# Patient Record
Sex: FEMALE | Race: BLACK OR AFRICAN AMERICAN | NOT HISPANIC OR LATINO | Employment: OTHER | URBAN - METROPOLITAN AREA
[De-identification: names, ages, dates, MRNs, and addresses within clinical notes are randomized per-mention and may not be internally consistent; named-entity substitution may affect disease eponyms.]

---

## 2017-11-09 ENCOUNTER — OFFICE VISIT (OUTPATIENT)
Dept: OBSTETRICS AND GYNECOLOGY | Facility: CLINIC | Age: 67
End: 2017-11-09
Payer: COMMERCIAL

## 2017-11-09 VITALS
SYSTOLIC BLOOD PRESSURE: 138 MMHG | HEIGHT: 59 IN | DIASTOLIC BLOOD PRESSURE: 83 MMHG | BODY MASS INDEX: 28.46 KG/M2 | WEIGHT: 141.19 LBS

## 2017-11-09 DIAGNOSIS — Z12.31 SCREENING MAMMOGRAM, ENCOUNTER FOR: ICD-10-CM

## 2017-11-09 DIAGNOSIS — Z12.4 SCREENING FOR CERVICAL CANCER: ICD-10-CM

## 2017-11-09 DIAGNOSIS — Z01.419 ENCOUNTER FOR GYNECOLOGICAL EXAMINATION (GENERAL) (ROUTINE) WITHOUT ABNORMAL FINDINGS: Primary | ICD-10-CM

## 2017-11-09 PROCEDURE — G0101 CA SCREEN;PELVIC/BREAST EXAM: HCPCS | Mod: PBBFAC,PN | Performed by: OBSTETRICS & GYNECOLOGY

## 2017-11-09 PROCEDURE — 99214 OFFICE O/P EST MOD 30 MIN: CPT | Mod: PBBFAC,25,PN | Performed by: OBSTETRICS & GYNECOLOGY

## 2017-11-09 PROCEDURE — 99397 PER PM REEVAL EST PAT 65+ YR: CPT | Mod: S$GLB,,, | Performed by: OBSTETRICS & GYNECOLOGY

## 2017-11-09 PROCEDURE — 99999 PR PBB SHADOW E&M-EST. PATIENT-LVL IV: CPT | Mod: PBBFAC,,, | Performed by: OBSTETRICS & GYNECOLOGY

## 2017-11-09 RX ORDER — NAPROXEN SODIUM 220 MG
TABLET ORAL
COMMUNITY
Start: 2015-06-03 | End: 2017-11-09

## 2017-11-09 RX ORDER — NAPROXEN 500 MG/1
500 TABLET ORAL
COMMUNITY
End: 2021-12-13

## 2017-11-09 RX ORDER — FLUTICASONE PROPIONATE 0.5 MG/G
CREAM TOPICAL
COMMUNITY
Start: 2017-10-26 | End: 2017-11-09

## 2017-11-09 RX ORDER — METHOTREXATE 2.5 MG/1
TABLET ORAL
COMMUNITY
Start: 2017-09-28 | End: 2017-11-09

## 2017-11-09 RX ORDER — IBUPROFEN 800 MG/1
800 TABLET ORAL
COMMUNITY
End: 2017-11-09

## 2017-11-09 RX ORDER — VALACYCLOVIR HYDROCHLORIDE 1 G/1
TABLET, FILM COATED ORAL
COMMUNITY
Start: 2016-07-12 | End: 2022-04-26 | Stop reason: ALTCHOICE

## 2017-11-09 RX ORDER — GUSELKUMAB 100 MG/ML
INJECTION SUBCUTANEOUS
COMMUNITY
Start: 2017-10-11 | End: 2022-04-26

## 2017-11-09 RX ORDER — HYDROCORTISONE ACETATE, PRAMOXINE HCL 2.5; 1 G/100G; G/100G
CREAM TOPICAL
COMMUNITY
Start: 2015-11-10 | End: 2022-04-26 | Stop reason: ALTCHOICE

## 2017-11-09 RX ORDER — GLIMEPIRIDE 4 MG/1
4 TABLET ORAL
COMMUNITY
Start: 2016-03-17 | End: 2017-11-09

## 2017-11-09 RX ORDER — ONDANSETRON 4 MG/1
4 TABLET, FILM COATED ORAL
COMMUNITY
Start: 2017-11-01 | End: 2018-11-01

## 2017-11-09 RX ORDER — METFORMIN HYDROCHLORIDE 500 MG/1
TABLET ORAL
COMMUNITY
Start: 2015-10-15 | End: 2017-11-09

## 2017-11-09 RX ORDER — METRONIDAZOLE 500 MG/1
TABLET ORAL
COMMUNITY
Start: 2017-10-30 | End: 2017-11-09

## 2017-11-09 RX ORDER — MECLIZINE HYDROCHLORIDE 25 MG/1
TABLET ORAL
COMMUNITY
Start: 2017-08-14 | End: 2021-12-13

## 2017-11-09 RX ORDER — INSULIN GLARGINE 100 [IU]/ML
INJECTION, SOLUTION SUBCUTANEOUS
COMMUNITY
Start: 2014-09-30 | End: 2017-11-09

## 2017-11-09 RX ORDER — FLUTICASONE PROPIONATE 50 MCG
1 SPRAY, SUSPENSION (ML) NASAL 2 TIMES DAILY
COMMUNITY
Start: 2017-09-20

## 2017-11-09 RX ORDER — CLOBETASOL PROPIONATE 0.5 MG/G
CREAM TOPICAL
COMMUNITY
Start: 2017-03-27 | End: 2017-11-09

## 2017-11-09 RX ORDER — ESOMEPRAZOLE MAGNESIUM 40 MG/1
CAPSULE, DELAYED RELEASE ORAL
COMMUNITY
Start: 2017-07-11 | End: 2017-11-09

## 2017-11-09 RX ORDER — MUPIROCIN CALCIUM 20 MG/G
CREAM TOPICAL
COMMUNITY
Start: 2015-12-18 | End: 2022-04-26 | Stop reason: ALTCHOICE

## 2017-11-09 RX ORDER — FLUCONAZOLE 150 MG/1
TABLET ORAL
COMMUNITY
Start: 2017-09-11 | End: 2018-03-08

## 2017-11-09 RX ORDER — VALSARTAN AND HYDROCHLOROTHIAZIDE 320; 25 MG/1; MG/1
TABLET, FILM COATED ORAL
COMMUNITY
Start: 2016-11-03 | End: 2017-11-09

## 2017-11-09 RX ORDER — OMEPRAZOLE 40 MG/1
CAPSULE, DELAYED RELEASE ORAL
COMMUNITY
Start: 2016-08-25 | End: 2017-11-09

## 2017-11-09 RX ORDER — CIPROFLOXACIN 500 MG/1
TABLET ORAL
COMMUNITY
Start: 2017-10-30 | End: 2018-03-08

## 2017-11-09 RX ORDER — HYDROCODONE BITARTRATE AND ACETAMINOPHEN 10; 325 MG/1; MG/1
1 TABLET ORAL
COMMUNITY
Start: 2017-10-31 | End: 2018-03-08

## 2017-11-09 RX ORDER — ACYCLOVIR 50 MG/G
OINTMENT TOPICAL
COMMUNITY
Start: 2017-10-26 | End: 2022-04-26 | Stop reason: ALTCHOICE

## 2017-11-09 RX ORDER — IBUPROFEN 200 MG
TABLET ORAL
COMMUNITY
Start: 2017-10-13 | End: 2022-04-26 | Stop reason: ALTCHOICE

## 2017-11-09 RX ORDER — TRIAMCINOLONE ACETONIDE 1 MG/G
OINTMENT TOPICAL
COMMUNITY
Start: 2016-12-05 | End: 2017-11-09

## 2017-11-09 RX ORDER — PROMETHAZINE HYDROCHLORIDE 25 MG/1
TABLET ORAL
COMMUNITY
Start: 2017-10-30 | End: 2022-04-26

## 2017-11-09 RX ORDER — SYRINGE,SAFETY WITH NEEDLE,1ML 25GX1"
SYRINGE (EA) MISCELLANEOUS
COMMUNITY
Start: 2014-05-23 | End: 2022-04-26 | Stop reason: ALTCHOICE

## 2017-11-09 NOTE — PROGRESS NOTES
Subjective:       Patient ID: Rolly Swain is a 67 y.o. female.    Chief Complaint:  Well Woman      History of Present Illness  HPI  Annual Exam-Postmenopausal  Patient presents for annual exam. The patient has no complaints today. The patient is not currently sexually active. GYN screening history: last pap: was normal and patient does not recall when last pap was and last mammogram: approximate date  and was normal. The patient is not currently taking hormone replacement therapy. Patient denies post-menopausal vaginal bleeding. The patient wears seatbelts: yes. The patient participates in regular exercise: yes--walking--but limited due to back issue (dr giron). Has the patient ever been transfused or tattooed?: yes. The patient reports that there is not domestic violence in her life.      Colonoscopy in 2017--may need colon resection--diverticulosis    C/o urgency symptoms        GYN & OB HistoryNo LMP recorded. Patient has had a hysterectomy.   Date of Last Pap: No result found    OB History    Para Term  AB Living   5 5       5   SAB TAB Ectopic Multiple Live Births                  # Outcome Date GA Lbr Brennon/2nd Weight Sex Delivery Anes PTL Lv   5 Para            4 Para            3 Para            2 Para            1 Para                   Review of Systems  Review of Systems   Constitutional: Negative for activity change, appetite change, chills, diaphoresis, fatigue, fever and unexpected weight change.   HENT: Negative for mouth sores and tinnitus.    Eyes: Negative for discharge and visual disturbance.   Respiratory: Negative for cough, shortness of breath and wheezing.    Cardiovascular: Negative for chest pain, palpitations and leg swelling.   Gastrointestinal: Negative for abdominal pain, bloating, blood in stool, constipation, diarrhea, nausea and vomiting.   Endocrine: Negative for diabetes, hair loss, hot flashes, hyperthyroidism and hypothyroidism.   Genitourinary: Negative  for decreased libido, dyspareunia, dysuria, flank pain, frequency, genital sores, hematuria, menorrhagia, menstrual problem, pelvic pain, urgency, vaginal bleeding, vaginal discharge, vaginal pain, dysmenorrhea, urinary incontinence, postcoital bleeding, postmenopausal bleeding and vaginal odor.   Musculoskeletal: Negative for back pain and myalgias.   Skin:  Negative for rash, no acne and hair changes.   Neurological: Negative for seizures, syncope, numbness and headaches.   Hematological: Negative for adenopathy. Does not bruise/bleed easily.   Psychiatric/Behavioral: Negative for depression and sleep disturbance. The patient is not nervous/anxious.    Breast: Negative for breast mass, breast pain, nipple discharge and skin changes          Objective:    Physical Exam:   Constitutional: She appears well-developed.     Eyes: Conjunctivae and EOM are normal. Pupils are equal, round, and reactive to light.    Neck: Normal range of motion. Neck supple.     Pulmonary/Chest: Effort normal. Right breast exhibits no mass, no nipple discharge, no skin change and no tenderness. Left breast exhibits no mass, no nipple discharge, no skin change and no tenderness. Breasts are symmetrical.        Abdominal: Soft.     Genitourinary: Rectum normal and vagina normal. Pelvic exam was performed with patient supine. Uterus is absent. Right adnexum displays no mass and no tenderness. Left adnexum displays no mass and no tenderness. No erythema, bleeding, rectocele, cystocele or unspecified prolapse of vaginal walls in the vagina. No vaginal discharge found. Vaginal cuff normal.Labial bartholins normal.Cervix exhibits absence. Additional cervical findings: pap smear done          Musculoskeletal: Normal range of motion.       Neurological: She is alert.    Skin: Skin is warm.    Psychiatric: She has a normal mood and affect.          Assessment:     Encounter Diagnoses   Name Primary?    Encounter for gynecological examination  (general) (routine) without abnormal findings Yes    Screening for cervical cancer     Screening mammogram, encounter for                Plan:      Continue annual well woman exam.  Pap not indicated due to hx of terrie; nml pap  mammo ordered (prefers at [facility])  Bmd ordered; osteoporosis prevention reviewed  Continue diet, exercise, weight loss

## 2018-01-04 ENCOUNTER — TELEPHONE (OUTPATIENT)
Dept: OBSTETRICS AND GYNECOLOGY | Facility: CLINIC | Age: 68
End: 2018-01-04

## 2018-01-04 NOTE — TELEPHONE ENCOUNTER
Please advise bone density is normal; continue calcium/vitamin d and weight bearing exercise as directed

## 2018-01-08 ENCOUNTER — TELEPHONE (OUTPATIENT)
Dept: OBSTETRICS AND GYNECOLOGY | Facility: CLINIC | Age: 68
End: 2018-01-08

## 2018-01-08 NOTE — TELEPHONE ENCOUNTER
Left messages for the pt. to call back. urban whitaker    Please advise bone density is normal; continue calcium/vitamin d and weight bearing exercise as directed

## 2018-01-08 NOTE — TELEPHONE ENCOUNTER
----- Message from Elyssa Dumont sent at 1/8/2018  1:11 PM CST -----  Contact: Pt.  Returning call to their office.  Call pt back at [phone] (qvow)

## 2018-01-08 NOTE — TELEPHONE ENCOUNTER
LM to discuss the following.... Bone density scan was normal, continue calcium/vitamin D and weight bearing exercise as needed. DS

## 2018-01-15 ENCOUNTER — TELEPHONE (OUTPATIENT)
Dept: OBSTETRICS AND GYNECOLOGY | Facility: CLINIC | Age: 68
End: 2018-01-15

## 2018-01-29 ENCOUNTER — TELEPHONE (OUTPATIENT)
Dept: OBSTETRICS AND GYNECOLOGY | Facility: CLINIC | Age: 68
End: 2018-01-29

## 2018-01-30 ENCOUNTER — TELEPHONE (OUTPATIENT)
Dept: OBSTETRICS AND GYNECOLOGY | Facility: CLINIC | Age: 68
End: 2018-01-30

## 2018-01-30 NOTE — TELEPHONE ENCOUNTER
Spoke to the pt. and informed her that her bone density is normal; continue calcium/vitamin d and weight bearing exercise as directed.  Pt states she's not sure if she will continue to take calcium or vit D.  Informed the pt. That Dr. Spring recommended it. Pt. acknowledged understanding. urban Saxena

## 2018-03-07 ENCOUNTER — TELEPHONE (OUTPATIENT)
Dept: OBSTETRICS AND GYNECOLOGY | Facility: CLINIC | Age: 68
End: 2018-03-07

## 2018-03-07 NOTE — TELEPHONE ENCOUNTER
----- Message from Nancy Obregon sent at 3/7/2018 11:00 AM CST -----  Contact: Pt  Please give pt a call at ..[phone](pdto) regarding something being called in for a discharge.

## 2018-03-07 NOTE — TELEPHONE ENCOUNTER
C/o vaginal discharge and she douched and woke up this morning soaking again. She wanted Dr. Spring to send her an Rx for vaginal discharge but I informed her we have to evaluate it physically to assist her properly because she is uncertain what's going on. Pt verbalizes understanding. DS

## 2018-03-08 ENCOUNTER — OFFICE VISIT (OUTPATIENT)
Dept: OBSTETRICS AND GYNECOLOGY | Facility: CLINIC | Age: 68
End: 2018-03-08
Payer: COMMERCIAL

## 2018-03-08 VITALS
DIASTOLIC BLOOD PRESSURE: 82 MMHG | HEIGHT: 59 IN | BODY MASS INDEX: 28.89 KG/M2 | WEIGHT: 143.31 LBS | SYSTOLIC BLOOD PRESSURE: 120 MMHG

## 2018-03-08 DIAGNOSIS — N95.2 ATROPHIC VAGINITIS: Primary | ICD-10-CM

## 2018-03-08 DIAGNOSIS — N89.8 VAGINAL DISCHARGE: ICD-10-CM

## 2018-03-08 PROCEDURE — 99999 PR PBB SHADOW E&M-EST. PATIENT-LVL IV: CPT | Mod: PBBFAC,,, | Performed by: OBSTETRICS & GYNECOLOGY

## 2018-03-08 PROCEDURE — 99213 OFFICE O/P EST LOW 20 MIN: CPT | Mod: S$GLB,,, | Performed by: OBSTETRICS & GYNECOLOGY

## 2018-03-08 PROCEDURE — 99214 OFFICE O/P EST MOD 30 MIN: CPT | Mod: PBBFAC,PN | Performed by: OBSTETRICS & GYNECOLOGY

## 2018-03-08 PROCEDURE — 87480 CANDIDA DNA DIR PROBE: CPT

## 2018-03-08 RX ORDER — INSULIN GLARGINE 300 U/ML
INJECTION, SOLUTION SUBCUTANEOUS
COMMUNITY
Start: 2018-01-27 | End: 2022-04-26

## 2018-03-09 LAB
CANDIDA RRNA VAG QL PROBE: NEGATIVE
G VAGINALIS RRNA GENITAL QL PROBE: NEGATIVE
T VAGINALIS RRNA GENITAL QL PROBE: NEGATIVE

## 2018-03-12 ENCOUNTER — TELEPHONE (OUTPATIENT)
Dept: OBSTETRICS AND GYNECOLOGY | Facility: CLINIC | Age: 68
End: 2018-03-12

## 2018-03-12 NOTE — TELEPHONE ENCOUNTER
Pt. Would like to try the estrogen cream. Spoke tot he pt. and advise the  vaginal cultures are negative for bacteria, yeast and trichomonas. Pt. Acknowledged understanding. urban Saxena

## 2019-08-29 NOTE — TELEPHONE ENCOUNTER
Infectious Disease outpatient follow up note      LOCATION: Aminata Cavazos is a 61 year old female who came here for follow up for DIANNA    S: stable, no new issue, no pain, no fever, no cough, no shortness of breath        Sitting on chair, RN in the room  Some cough    ROS:    General: no fever, no pain  HEENT: no visual problems, no hearing issues, no headache  Neck: No swelling or pain  Chest: no coughing, no shortness of breath  CVS: no chest pain, no palpitation  GI: no nausea, vomiting, no diarrhea, no abd pain  : no urinary frequency, no dysuria, no discharge  Ext no edema   Skin: no rash  Neuro: no weakness, no tremors, no haedache  Psych: no anxiety, no depression        O:        Vital Last Value   Temperature 98.5 °F (36.9 °C) (08/29/19 1526)   Pulse 91 (08/29/19 1526)   Respiratory 14 (08/29/19 1526)   Non-Invasive  Blood Pressure (!) 88/50 (08/29/19 1526)   Pulse Oximetry 93 % (08/29/19 1526)   Arterial   Blood Pressure       Vital Today   Weight 38.3 kg (08/29/19 1526)   Height N/A   BMI N/A        EXAM:  GEN:  Stable, not in acute distress  HEENT: no icterus, no conjunctival hemorrhages  NECK: supple, No JVD, No thyromegaly, No Bruit  CVS: s1s2 audible, regular  CHEST: clear, decreased breath sounds at bases  ABD: soft, non tender  EXT: no edema  NEURO: grossly non focal  SKIN: no rash, no ulcer  LYMP: no cervical lymphadenopathy  PSYCH: stable, not anxious or depressed     LABS:     Reviewed:       CULTURES:  Reviewed: Blood:                                         Other:     IMAGING:  Reviewed: CXR:                      CT Scan:                                           Other     MEDS:       Severe chronic obstructive pulmonary disease.   2.  Mycobacterium avium intracellulare lung infection.  3.  Elevated troponin and NSTMI.  4.  Cardiomyopathy.     RECOMMENDATIONS:  Discussed with nursing staff in detail.  Will continue current antibiotic.    Rifampin, ethambutol  Left messages for the pt. to call back. urban whitaker    Please advise bone density is normal; continue calcium/vitamin d and weight bearing exercise as directed   clarithromycin  Follow LFTs. monthly  Eye screening, follow up ophthalmology for base line  Need to get monthly sputum, compliance issue d/w pt  Follow imaging  Depending on cultures we will modify the patient's edema.  We will adjust antibiotic dose according to creatinine clearance.  I will closely follow this patient with the primary team. Thanks for involving me in this patient's care.    Counseling done, risk , benefit, treatment side effects discussed with patient, all questions answered     Follow up in 8 weeks    Discussed with: Patient, RN,  Will follow,    Thanks,    Nelson Rodriguez MD    Please dial [phone] for answering service  CC: Mat Wren DO

## 2021-12-13 ENCOUNTER — OFFICE VISIT (OUTPATIENT)
Dept: OBSTETRICS AND GYNECOLOGY | Facility: CLINIC | Age: 71
End: 2021-12-13
Payer: COMMERCIAL

## 2021-12-13 VITALS
BODY MASS INDEX: 26.8 KG/M2 | DIASTOLIC BLOOD PRESSURE: 80 MMHG | HEIGHT: 59 IN | WEIGHT: 132.94 LBS | SYSTOLIC BLOOD PRESSURE: 124 MMHG

## 2021-12-13 DIAGNOSIS — N94.10 DYSPAREUNIA IN FEMALE: ICD-10-CM

## 2021-12-13 DIAGNOSIS — N95.2 VAGINAL ATROPHY: ICD-10-CM

## 2021-12-13 DIAGNOSIS — R30.0 DYSURIA: ICD-10-CM

## 2021-12-13 DIAGNOSIS — Z12.31 SCREENING MAMMOGRAM, ENCOUNTER FOR: ICD-10-CM

## 2021-12-13 DIAGNOSIS — Z12.4 SCREENING FOR CERVICAL CANCER: ICD-10-CM

## 2021-12-13 DIAGNOSIS — Z01.419 ENCOUNTER FOR GYNECOLOGICAL EXAMINATION (GENERAL) (ROUTINE) WITHOUT ABNORMAL FINDINGS: Primary | ICD-10-CM

## 2021-12-13 PROCEDURE — 99387 PR PREVENTIVE VISIT,NEW,65 & OVER: ICD-10-PCS | Mod: S$GLB,,, | Performed by: OBSTETRICS & GYNECOLOGY

## 2021-12-13 PROCEDURE — G0101 CA SCREEN;PELVIC/BREAST EXAM: HCPCS | Mod: PBBFAC,PN | Performed by: OBSTETRICS & GYNECOLOGY

## 2021-12-13 PROCEDURE — 99387 INIT PM E/M NEW PAT 65+ YRS: CPT | Mod: S$GLB,,, | Performed by: OBSTETRICS & GYNECOLOGY

## 2021-12-13 PROCEDURE — 99999 PR PBB SHADOW E&M-NEW PATIENT-LVL IV: ICD-10-PCS | Mod: PBBFAC,,, | Performed by: OBSTETRICS & GYNECOLOGY

## 2021-12-13 PROCEDURE — 99999 PR PBB SHADOW E&M-NEW PATIENT-LVL IV: CPT | Mod: PBBFAC,,, | Performed by: OBSTETRICS & GYNECOLOGY

## 2021-12-13 PROCEDURE — 99204 OFFICE O/P NEW MOD 45 MIN: CPT | Mod: PBBFAC,PN,25 | Performed by: OBSTETRICS & GYNECOLOGY

## 2021-12-13 PROCEDURE — 87086 URINE CULTURE/COLONY COUNT: CPT | Performed by: OBSTETRICS & GYNECOLOGY

## 2021-12-13 RX ORDER — ATORVASTATIN CALCIUM 20 MG/1
20 TABLET, FILM COATED ORAL
COMMUNITY
Start: 2021-07-24 | End: 2022-04-26

## 2021-12-13 RX ORDER — PREGABALIN 100 MG/1
100 CAPSULE ORAL 2 TIMES DAILY
COMMUNITY
Start: 2021-11-15

## 2021-12-13 RX ORDER — CHOLECALCIFEROL (VITAMIN D3) 1250 MCG
1 TABLET ORAL
COMMUNITY
Start: 2021-04-28

## 2021-12-13 RX ORDER — ESTRADIOL 10 UG/1
INSERT VAGINAL
Qty: 18 TABLET | Refills: 6 | Status: SHIPPED | OUTPATIENT
Start: 2021-12-13 | End: 2022-04-26

## 2021-12-13 RX ORDER — RISANKIZUMAB-RZAA 75 MG/0.83
150 KIT SUBCUTANEOUS
COMMUNITY
Start: 2021-06-18

## 2021-12-13 RX ORDER — GLIPIZIDE 5 MG/1
5 TABLET ORAL DAILY
COMMUNITY
Start: 2021-11-13

## 2021-12-13 RX ORDER — SERTRALINE HYDROCHLORIDE 50 MG/1
TABLET, FILM COATED ORAL
COMMUNITY
Start: 2021-08-20 | End: 2022-04-26

## 2021-12-13 RX ORDER — TRAZODONE HYDROCHLORIDE 50 MG/1
TABLET ORAL
COMMUNITY
Start: 2021-08-20 | End: 2022-04-26

## 2021-12-13 RX ORDER — DULAGLUTIDE 1.5 MG/.5ML
1.5 INJECTION, SOLUTION SUBCUTANEOUS
COMMUNITY
Start: 2021-07-17

## 2021-12-14 LAB — BACTERIA UR CULT: NO GROWTH

## 2022-03-11 ENCOUNTER — TELEPHONE (OUTPATIENT)
Dept: GASTROENTEROLOGY | Facility: CLINIC | Age: 72
End: 2022-03-11
Payer: COMMERCIAL

## 2022-03-11 NOTE — TELEPHONE ENCOUNTER
Phoned patient to schedule office visit with Dr. Osorio per referral from Dr. Ortiz for colon stricture.   Patient declined  3/17/22 appt as she had a pain management appt.  Scheduled her for first available appt on 5/12/22.  Will call patient if we have any cancellations before that date.

## 2022-03-17 ENCOUNTER — OFFICE VISIT (OUTPATIENT)
Dept: GASTROENTEROLOGY | Facility: CLINIC | Age: 72
End: 2022-03-17
Payer: COMMERCIAL

## 2022-03-17 VITALS
SYSTOLIC BLOOD PRESSURE: 130 MMHG | DIASTOLIC BLOOD PRESSURE: 78 MMHG | WEIGHT: 135.81 LBS | BODY MASS INDEX: 27.38 KG/M2 | HEIGHT: 59 IN | HEART RATE: 72 BPM

## 2022-03-17 DIAGNOSIS — K57.90 DIVERTICULOSIS: ICD-10-CM

## 2022-03-17 DIAGNOSIS — R10.9 ABDOMINAL PAIN, UNSPECIFIED ABDOMINAL LOCATION: ICD-10-CM

## 2022-03-17 DIAGNOSIS — K56.699 COLON STRICTURE: Primary | ICD-10-CM

## 2022-03-17 PROCEDURE — 99215 OFFICE O/P EST HI 40 MIN: CPT | Mod: PBBFAC | Performed by: INTERNAL MEDICINE

## 2022-03-17 PROCEDURE — 99204 OFFICE O/P NEW MOD 45 MIN: CPT | Mod: S$GLB,,, | Performed by: INTERNAL MEDICINE

## 2022-03-17 PROCEDURE — 99999 PR PBB SHADOW E&M-EST. PATIENT-LVL V: ICD-10-PCS | Mod: PBBFAC,,, | Performed by: INTERNAL MEDICINE

## 2022-03-17 PROCEDURE — 99999 PR PBB SHADOW E&M-EST. PATIENT-LVL V: CPT | Mod: PBBFAC,,, | Performed by: INTERNAL MEDICINE

## 2022-03-17 PROCEDURE — 99204 PR OFFICE/OUTPT VISIT, NEW, LEVL IV, 45-59 MIN: ICD-10-PCS | Mod: S$GLB,,, | Performed by: INTERNAL MEDICINE

## 2022-03-17 NOTE — PATIENT INSTRUCTIONS
Schedule a colonoscopy with dilating (stretching) the narrowing in the colon. I will also place a temporary stent that will stretch it. You will need to come back in a few months to remove it and will schedule that for you.   You will need to be on a special diet called a stent diet that was handed to you.

## 2022-03-17 NOTE — PROGRESS NOTES
Clinic Consult:  [facility] Consultation Note    Reason for Consult:  The encounter diagnosis was Colon stricture.    PCP: Louise Ingram   [address] [facility] [address]    HPI:  This is a 71 y.o. female here for evaluation of a colon stricture.   Ms. Swain is here with her . She had a colonoscopy on  3/7/2022 showed a a cm stricture in the sigmoid that was not teraversable.     EGD  On 3/7/2022 that hsowed gastritis and biopsies obtianed. Food seen in the gastric cavity as well.     Surgery in June 2022 it was a sigmoid colectomy with Dr. Pk Groves.     Prior diverticulitis s/p resection she had a colonoscopy in 2016/2017 in August 2021 that showed sigmoid diverticulitis persistent inflammation and no obstruction.    Currently has  pain pain is post-prandial in the gets bloating and dull uncomfortale feeling. Pain improves with passing flatus.     ROS:  CONSTITUTIONAL: Denies weight change,  fatigue, fevers, chills, night sweats.  EYES: No changes in vision.   ENT: No oral lesions or sore throat.  HEMATOLOGICAL/Lymph: Denies bleeding tendency, bruising tendency. No swellings or enlarged lymph nodes.  CARDIOVASCULAR: Denies chest pain, shortness of breath, orthopnea and edema.  RESPIRATORY: Denies cough, hemoptysis, dyspnea, and wheezing.  GI: See HPI.  : Denies dysuria and hematuria  MUSCULOSKELETAL: Denies joint pain or swelling, back pain and muscle pain.  SKIN: Denies rashes.  NEUROLOGIC: Denies headaches, seizures and numbness.  PSYCHIATRIC: Denies depression or anxiety.  ENDOCRINE: Denies heat or cold intolerance and excessive thirst or urination.    Medical History:   Past Medical History:   Diagnosis Date    Abnormal Pap smear of vagina 2013    Contusion of left arm chronic pain    Depression     Diabetes mellitus type II     GERD (gastroesophageal reflux disease)     Hx SBO     Hyperlipidemia     Hypertension     Psoriatic arthritis        Surgical  History:  Past Surgical History:   Procedure Laterality Date    COLONOSCOPY  9/24/2013    ESOPHAGOGASTRODUODENOSCOPY  9/20/2010    HYSTERECTOMY      SMALL INTESTINE SURGERY      2 small bowel resections 2/2 to SBO    SMALL INTESTINE SURGERY  06/2021    TONSILLECTOMY      TUBAL LIGATION         Family History:   Family History   Problem Relation Age of Onset    Hypertension Mother     Heart disease Mother     COPD Father     Heart disease Father     Heart disease Sister     Diabetes Sister     Heart disease Brother     Diabetes Brother     Heart disease Son     Diabetes Son     Hyperlipidemia Son     Hypertension Son        Social History:   Social History     Tobacco Use    Smoking status: Never Smoker    Smokeless tobacco: Never Used   Substance Use Topics    Alcohol use: No    Drug use: No       Allergies: Reviewed    Home Medications:   Medication List with Changes/Refills   Current Medications    ACYCLOVIR 5% (ZOVIRAX) 5 % OINTMENT    Apply four times daily until resolved    ATORVASTATIN (LIPITOR) 20 MG TABLET    Take 20 mg by mouth.    BUSPIRONE (BUSPAR) 5 MG TAB    Take 5 mg by mouth.    CHOLECALCIFEROL, VITAMIN D3, 1,250 MCG (50,000 UNIT) TAB    Take 1 tablet by mouth.    CLOBETASOL (TEMOVATE) 0.05 % CREAM    Apply topically.    DULAGLUTIDE (TRULICITY) 1.5 MG/0.5 ML PEN INJECTOR        ESTRADIOL (VAGIFEM) 10 MCG TAB    Place 1 tablet (10 mcg total) vaginally every evening for 14 days, THEN 1 tablet (10 mcg total) twice a week for 14 days.    FLUTICASONE (FLONASE) 50 MCG/ACTUATION NASAL SPRAY        GLIPIZIDE (GLUCOTROL) 5 MG TABLET    Take 5 mg by mouth once daily.    HYDROCODONE-ACETAMINOPHEN 10-325MG (NORCO)  MG TAB    Take 1 tablet by mouth.    INSULIN SYRINGE-NEEDLE U-100 0.3 ML 29 GAUGE SYRG        INSULIN SYRINGE-NEEDLE U-100 1 ML 31 GAUGE X 5/16 SYRG    USE ONCE DAILY    INSULIN SYRINGE-NEEDLE U-100 1/2 ML 30 SYRG    USE ONCE DAILY AS DIRECTED    IXEKIZUMAB (TALTZ SYRINGE  "SUBQ)    Inject into the skin. Every 4 weeks    IXEKIZUMAB 80 MG/ML ATIN    Inject into the skin.    IXEKIZUMAB 80 MG/ML SYRG    Inject 80 mg ( 1 injection) SQ every 4 weeks    LINACLOTIDE (LINZESS) 145 MCG CAP CAPSULE    Take 145 mcg by mouth.    METFORMIN (GLUCOPHAGE) 500 MG TABLET    Take 500 mg by mouth 2 (two) times daily with meals.    METHOTREXATE 2.5 MG TAB    Take 6 tablets by mouth once a week.    MOMETASONE (NASONEX) 50 MCG/ACTUATION NASAL SPRAY    2 sprays by Nasal route 2 (two) times daily.    MUPIROCIN CALCIUM 2% (BACTROBAN) 2 % CREAM    Apply to affected area bid    OMEPRAZOLE (PRILOSEC) 40 MG CAPSULE    Take 1 capsule by mouth once daily.    PANTOPRAZOLE (PROTONIX) 40 MG TABLET    Take 40 mg by mouth.    PRAMOXINE-HYDROCORTISONE CREAM    Apply qid for itching.  Dispense the 2.5% cream    PREGABALIN (LYRICA) 75 MG CAPSULE    Take 75 mg by mouth 2 (two) times daily.    PROAIR HFA 90 MCG/ACTUATION INHALER    Take 2 puffs by mouth as needed.    PROMETHAZINE (PHENERGAN) 25 MG TABLET        RISANKIZUMAB-RZAA (SKYRIZI) 150MG/1.66ML(75 MG/0.83 ML X2) SUBCUTANEOUS INJECTION        SERTRALINE (ZOLOFT) 50 MG TABLET        TALTZ AUTOINJECTOR 80 MG/ML ATIN    every 28 days.    TOUJEO SOLOSTAR U-300 INSULIN 300 UNIT/ML (1.5 ML) INPN PEN        TRAZODONE (DESYREL) 50 MG TABLET        TREMFYA 100 MG/ML SYRG        TRIAMCINOLONE ACETONIDE 0.1% (KENALOG) 0.1 % OINTMENT    2 (two) times daily.    VALACYCLOVIR (VALTREX) 1000 MG TABLET    1 po qd    VALSARTAN-HYDROCHLOROTHIAZIDE (DIOVAN-HCT) 320-25 MG PER TABLET    Take 1 tablet by mouth once daily.         Physical Exam:  Vital Signs:  /78   Pulse 72   Ht 4' 11" (1.499 m)   Wt 61.6 kg (135 lb 12.9 oz)   BMI 27.43 kg/m²   Body mass index is 27.43 kg/m².      GENERAL: No acute distress, A&Ox3  EYES: Anicteric, no pallor noted.  ENT: OP clear  NECK: Supple, no masses, no thyromegally.  CHEST: Equal breath sounds bilaterally, no wheezing.  CARDIOVASCULAR: Regular " rate and rhythm. Murmurs, rubs and gallops absent.  ABDOMEN: soft, non-tender, non-distended, normal bowel sounds, no hepatosplenomegaly   EXTREMITIES: No clubbing, cyanosis or edema.  SKIN: Without lesion or erythema.  LYMPH: No cervical, axillary lymphadenopathy palpable.   NEUROLOGICAL: Grossly normal, no asterixis present.    Labs: Pertinent labs reviewed.    Assessment and Plan:  Colon stricture  -     Case Request Endoscopy: SIGMOIDOSCOPY, FLEXIBLE with stent placement and dilation      Plan for flex sig with dilation and fully covered self expanding metal stent placement with kenalog injection then follow up in 3-4 months to remove the stent.  Will plan to use a LAMS stent 15 mm diameter or 20 mm diameter and she was informed that the stent may be passed and migrate out once the stricture expands. Will plan to use fluoroscopy as well.    Risks and benefits of the procedure were discussed including the risks of bleeding, perforation, requiring surgery, risks related to anesthesia. The patient expressed understanding and agreed to proceed. No language barriers were present.       No follow-ups on file.        Thank you so much for allowing me to participate in the care of Rolly Osorio MD  Gastroenterology

## 2022-03-18 NOTE — PROGRESS NOTES
Subjective:       Patient ID: Rolly Swain is a 67 y.o. female.    Chief Complaint:  Vaginal Discharge      History of Present Illness  HPI  here for problem   C/o vaginal discharge; noticed increased discharge on underwear and panti liner;   Recently douched (first time in years)  Also reports increased use of antibiotics when being treated for kidney stone  No intercourse  Has recently changed meds for diabetes  Also c/o burning sensation in vagina      GYN & OB History  No LMP recorded. Patient has had a hysterectomy.   Date of Last Pap: No result found    OB History    Para Term  AB Living   5 5       5   SAB TAB Ectopic Multiple Live Births                  # Outcome Date GA Lbr Brennon/2nd Weight Sex Delivery Anes PTL Lv   5 Para            4 Para            3 Para            2 Para            1 Para                   Review of Systems  Review of Systems   Constitutional: Negative for activity change, appetite change, chills, diaphoresis, fatigue, fever and unexpected weight change.   HENT: Negative for mouth sores and tinnitus.    Eyes: Negative for discharge and visual disturbance.   Respiratory: Negative for cough, shortness of breath and wheezing.    Cardiovascular: Negative for chest pain, palpitations and leg swelling.   Gastrointestinal: Negative for abdominal pain, bloating, blood in stool, constipation, diarrhea, nausea and vomiting.   Endocrine: Negative for diabetes, hair loss, hot flashes, hyperthyroidism and hypothyroidism.   Genitourinary: Positive for vaginal discharge. Negative for decreased libido, dyspareunia, dysuria, flank pain, frequency, genital sores, hematuria, menorrhagia, menstrual problem, pelvic pain, urgency, vaginal bleeding, vaginal pain, dysmenorrhea, urinary incontinence, postcoital bleeding, postmenopausal bleeding and vaginal odor.   Musculoskeletal: Negative for back pain and myalgias.   Skin:  Negative for rash, no acne and hair changes.   Neurological:  Negative for seizures, syncope, numbness and headaches.   Hematological: Negative for adenopathy. Does not bruise/bleed easily.   Psychiatric/Behavioral: Negative for depression and sleep disturbance. The patient is not nervous/anxious.    Breast: Negative for breast mass, breast pain, nipple discharge and skin changes          Objective:    Physical Exam:   Constitutional: She appears well-developed.     Eyes: Conjunctivae and EOM are normal. Pupils are equal, round, and reactive to light.    Neck: Normal range of motion. Neck supple.     Pulmonary/Chest: Effort normal. Right breast exhibits no mass, no nipple discharge, no skin change, no tenderness and presence. Left breast exhibits no mass, no nipple discharge, no skin change, no tenderness and presence. Breasts are symmetrical.        Abdominal: Soft.     Genitourinary: Uterus normal. Pelvic exam was performed with patient supine. Vaginal discharge (white) found.           Musculoskeletal: Normal range of motion.       Neurological: She is alert.    Skin: Skin is warm.    Psychiatric: She has a normal mood and affect.          Assessment:     Encounter Diagnoses   Name Primary?    Atrophic vaginitis Yes    Vaginal discharge              Plan:      Affirm today; treat as indicated by results  Suspect atrophic vaginitis--can consider short course of vaginal estrogen                                  yes

## 2022-03-25 ENCOUNTER — ANESTHESIA EVENT (OUTPATIENT)
Dept: ENDOSCOPY | Facility: HOSPITAL | Age: 72
End: 2022-03-25
Payer: COMMERCIAL

## 2022-03-25 ENCOUNTER — HOSPITAL ENCOUNTER (OUTPATIENT)
Facility: HOSPITAL | Age: 72
Discharge: HOME | End: 2022-03-25
Attending: INTERNAL MEDICINE | Admitting: INTERNAL MEDICINE
Payer: COMMERCIAL

## 2022-03-25 ENCOUNTER — ANESTHESIA (OUTPATIENT)
Dept: ENDOSCOPY | Facility: HOSPITAL | Age: 72
End: 2022-03-25
Payer: COMMERCIAL

## 2022-03-25 VITALS
BODY MASS INDEX: 26.21 KG/M2 | OXYGEN SATURATION: 100 % | TEMPERATURE: 98 F | WEIGHT: 130 LBS | HEIGHT: 59 IN | DIASTOLIC BLOOD PRESSURE: 88 MMHG | HEART RATE: 56 BPM | RESPIRATION RATE: 16 BRPM | SYSTOLIC BLOOD PRESSURE: 144 MMHG

## 2022-03-25 DIAGNOSIS — K91.30 ANASTOMOTIC STRICTURE OF COLORECTAL REGION: Primary | ICD-10-CM

## 2022-03-25 PROCEDURE — 74360 PR  X-RAY GUIDE, GI DILATION: ICD-10-PCS | Mod: 26,,, | Performed by: INTERNAL MEDICINE

## 2022-03-25 PROCEDURE — 37000008 HC ANESTHESIA 1ST 15 MINUTES: Performed by: INTERNAL MEDICINE

## 2022-03-25 PROCEDURE — 37000009 HC ANESTHESIA EA ADD 15 MINS: Performed by: INTERNAL MEDICINE

## 2022-03-25 PROCEDURE — 45347 SIGMOIDOSCOPY W/PLCMT STENT: CPT | Performed by: INTERNAL MEDICINE

## 2022-03-25 PROCEDURE — 25000003 PHARM REV CODE 250: Performed by: NURSE ANESTHETIST, CERTIFIED REGISTERED

## 2022-03-25 PROCEDURE — C1726 CATH, BAL DIL, NON-VASCULAR: HCPCS | Performed by: INTERNAL MEDICINE

## 2022-03-25 PROCEDURE — 63600175 PHARM REV CODE 636 W HCPCS: Performed by: INTERNAL MEDICINE

## 2022-03-25 PROCEDURE — 45347 PR SIGMOIDOSCOPY W/PLCMT STENT: ICD-10-PCS | Mod: 52,,, | Performed by: INTERNAL MEDICINE

## 2022-03-25 PROCEDURE — 45335 PR SIGMOIDOSCOPY,FLEX,W/DIR SUBMUC INJECT: ICD-10-PCS | Mod: 51,52,, | Performed by: INTERNAL MEDICINE

## 2022-03-25 PROCEDURE — 45347 SIGMOIDOSCOPY W/PLCMT STENT: CPT | Mod: 52,,, | Performed by: INTERNAL MEDICINE

## 2022-03-25 PROCEDURE — 45335 SIGMOIDOSCOPY W/SUBMUC INJ: CPT | Performed by: INTERNAL MEDICINE

## 2022-03-25 PROCEDURE — C1874 STENT, COATED/COV W/DEL SYS: HCPCS | Performed by: INTERNAL MEDICINE

## 2022-03-25 PROCEDURE — 27201028 HC NEEDLE, SCLERO: Performed by: INTERNAL MEDICINE

## 2022-03-25 PROCEDURE — 63600175 PHARM REV CODE 636 W HCPCS: Performed by: NURSE ANESTHETIST, CERTIFIED REGISTERED

## 2022-03-25 PROCEDURE — 74360 X-RAY GUIDE GI DILATION: CPT | Mod: 26,,, | Performed by: INTERNAL MEDICINE

## 2022-03-25 PROCEDURE — 45335 SIGMOIDOSCOPY W/SUBMUC INJ: CPT | Mod: 51,52,, | Performed by: INTERNAL MEDICINE

## 2022-03-25 RX ORDER — PROPOFOL 10 MG/ML
VIAL (ML) INTRAVENOUS
Status: DISCONTINUED | OUTPATIENT
Start: 2022-03-25 | End: 2022-03-25

## 2022-03-25 RX ORDER — SODIUM CHLORIDE, SODIUM LACTATE, POTASSIUM CHLORIDE, CALCIUM CHLORIDE 600; 310; 30; 20 MG/100ML; MG/100ML; MG/100ML; MG/100ML
INJECTION, SOLUTION INTRAVENOUS CONTINUOUS PRN
Status: DISCONTINUED | OUTPATIENT
Start: 2022-03-25 | End: 2022-03-25

## 2022-03-25 RX ORDER — TRIAMCINOLONE ACETONIDE 40 MG/ML
40 INJECTION, SUSPENSION INTRA-ARTICULAR; INTRAMUSCULAR ONCE
Status: COMPLETED | OUTPATIENT
Start: 2022-03-25 | End: 2022-03-25

## 2022-03-25 RX ORDER — SODIUM CHLORIDE 9 MG/ML
INJECTION, SOLUTION INTRAVENOUS CONTINUOUS
Status: DISCONTINUED | OUTPATIENT
Start: 2022-03-25 | End: 2022-03-25 | Stop reason: HOSPADM

## 2022-03-25 RX ORDER — PROPOFOL 10 MG/ML
VIAL (ML) INTRAVENOUS CONTINUOUS PRN
Status: DISCONTINUED | OUTPATIENT
Start: 2022-03-25 | End: 2022-03-25

## 2022-03-25 RX ORDER — LIDOCAINE HYDROCHLORIDE 10 MG/ML
INJECTION, SOLUTION EPIDURAL; INFILTRATION; INTRACAUDAL; PERINEURAL
Status: DISCONTINUED | OUTPATIENT
Start: 2022-03-25 | End: 2022-03-25

## 2022-03-25 RX ADMIN — PROPOFOL 50 MCG/KG/MIN: 10 INJECTION, EMULSION INTRAVENOUS at 08:03

## 2022-03-25 RX ADMIN — LIDOCAINE HYDROCHLORIDE 50 MG: 10 INJECTION, SOLUTION EPIDURAL; INFILTRATION; INTRACAUDAL; PERINEURAL at 08:03

## 2022-03-25 RX ADMIN — PROPOFOL 70 MG: 10 INJECTION, EMULSION INTRAVENOUS at 08:03

## 2022-03-25 RX ADMIN — SODIUM CHLORIDE, SODIUM LACTATE, POTASSIUM CHLORIDE, AND CALCIUM CHLORIDE: 600; 310; 30; 20 INJECTION, SOLUTION INTRAVENOUS at 08:03

## 2022-03-25 RX ADMIN — PROPOFOL 30 MG: 10 INJECTION, EMULSION INTRAVENOUS at 09:03

## 2022-03-25 RX ADMIN — TRIAMCINOLONE ACETONIDE 40 MG: 40 INJECTION, SUSPENSION INTRA-ARTICULAR; INTRAMUSCULAR at 09:03

## 2022-03-25 RX ADMIN — PROPOFOL 50 MG: 10 INJECTION, EMULSION INTRAVENOUS at 08:03

## 2022-03-25 NOTE — ANESTHESIA PREPROCEDURE EVALUATION
03/25/2022  Rolly Swain is a 71 y.o., female.      Pre-op Assessment    I have reviewed the Patient Summary Reports.     I have reviewed the Nursing Notes. I have reviewed the NPO Status.   I have reviewed the Medications.     Review of Systems  Anesthesia Hx:  No problems with previous Anesthesia    Social:  Non-Smoker, No Alcohol Use    Hematology/Oncology:     Oncology Normal    -- Anemia:   EENT/Dental:EENT/Dental Normal   Cardiovascular:   Hypertension, poorly controlled hyperlipidemia    Pulmonary:   Asthma    Renal/:  Renal/ Normal     Hepatic/GI:   Bowel Prep. GERD, well controlled    Musculoskeletal:  Spine Disorders: lumbar Chronic Pain    Neurological:  Neurology Normal    Endocrine:   Diabetes, well controlled    Dermatological:  Skin Normal    Psych:   anxiety depression          Physical Exam  General: Well nourished, Cooperative, Alert and Oriented    Airway:  Mallampati: III   Mouth Opening: Small, but > 3cm  TM Distance: Normal  Tongue: Normal  Neck ROM: Normal ROM    Dental:  Intact, Dentures        Anesthesia Plan  Type of Anesthesia, risks & benefits discussed:    Anesthesia Type: MAC  Intra-op Monitoring Plan: Standard ASA Monitors  Post Op Pain Control Plan: multimodal analgesia  Informed Consent: Informed consent signed with the Patient and all parties understand the risks and agree with anesthesia plan.  All questions answered.   ASA Score: 3  Day of Surgery Review of History & Physical: H&P Update referred to the surgeon/provider.    Ready For Surgery From Anesthesia Perspective.     .

## 2022-03-25 NOTE — ANESTHESIA RELEASE NOTE
"Anesthesia Release from PACU Note    Patient: Rolly Swain    Procedure(s) Performed: Procedure(s) (LRB):  SIGMOIDOSCOPY, FLEXIBLE with stent placement and dilation (N/A)    Anesthesia type: MAC    Post pain: Adequate analgesia    Post assessment: no apparent anesthetic complications and tolerated procedure well    Last Vitals:   Visit Vitals  BP (!) 144/88 (BP Location: Left arm)   Pulse (!) 56   Temp 36.4 °C (97.6 °F) (Temporal)   Resp 16   Ht 4' 11" (1.499 m)   Wt 59 kg (130 lb)   SpO2 100%   Breastfeeding No   BMI 26.26 kg/m²       Post vital signs: stable    Level of consciousness: awake, alert  and oriented    Nausea/Vomiting: no nausea/no vomiting    Complications: none    Airway Patency: patent    Respiratory: unassisted, spontaneous ventilation, room air    Cardiovascular: stable and blood pressure at baseline    Hydration: euvolemic  "

## 2022-03-25 NOTE — TRANSFER OF CARE
"Anesthesia Transfer of Care Note    Patient: Rolly Swain    Procedure(s) Performed: Procedure(s) (LRB):  SIGMOIDOSCOPY, FLEXIBLE with stent placement and dilation (N/A)    Patient location: GI    Anesthesia Type: MAC    Transport from OR: Transported from OR on room air with adequate spontaneous ventilation    Post pain: adequate analgesia    Post assessment: no apparent anesthetic complications and tolerated procedure well    Post vital signs: stable    Level of consciousness: awake, alert and oriented    Nausea/Vomiting: no nausea/vomiting    Complications: none    Transfer of care protocol was followed      Last vitals:   Visit Vitals  /84   Pulse 66   Temp 36.5 °C (97.7 °F) (Temporal)   Resp 18   Ht 4' 11" (1.499 m)   Wt 59 kg (130 lb)   SpO2 99%   Breastfeeding No   BMI 26.26 kg/m²     "

## 2022-03-25 NOTE — PROVATION PATIENT INSTRUCTIONS
Discharge Summary/Instructions after an Endoscopic Procedure  Patient Name: Rolly Swain  Patient MRN: [id number]  Patient YOB: 1950 Friday, March 25, 2022 Nish Osorio MD  Dear patient,  As a result of recent federal legislation (The Federal Cures Act), you may   receive lab or pathology results from your procedure in your [facility]   account before your physician is able to contact you. Your physician or   their representative will relay the results to you with their   recommendations at their soonest availability.  Thank you,  RESTRICTIONS:  During your procedure today, you received medications for sedation.  These   medications may affect your judgment, balance and coordination.  Therefore,   for 24 hours, you have the following restrictions:   - DO NOT drive a car, operate machinery, make legal/financial decisions,   sign important papers or drink alcohol.    ACTIVITY:  Today: no heavy lifting, straining or running due to procedural   sedation/anesthesia.  The following day: return to full activity including work.  DIET:  Eat and drink normally unless instructed otherwise.     TREATMENT FOR COMMON SIDE EFFECTS:  - Mild abdominal pain, nausea, belching, bloating or excessive gas:  rest,   eat lightly and use a heating pad.  - Sore Throat: treat with throat lozenges and/or gargle with warm salt   water.  - Because air was used during the procedure, expelling large amounts of air   from your rectum or belching is normal.  - If a bowel prep was taken, you may not have a bowel movement for 1-3 days.    This is normal.  SYMPTOMS TO WATCH FOR AND REPORT TO YOUR PHYSICIAN:  1. Abdominal pain or bloating, other than gas cramps.  2. Chest pain.  3. Back pain.  4. Signs of infection such as: chills or fever occurring within 24 hours   after the procedure.  5. Rectal bleeding, which would show as bright red, maroon, or black stools.   (A tablespoon of blood from the rectum is not serious,  especially if   hemorrhoids are present.)  6. Vomiting.  7. Weakness or dizziness.  GO DIRECTLY TO THE NEAREST EMERGENCY ROOM IF YOU HAVE ANY OF THE FOLLOWING:      Difficulty breathing              Chills and/or fever over 101 F   Persistent vomiting and/or vomiting blood   Severe abdominal pain   Severe chest pain   Black, tarry stools   Bleeding- more than one tablespoon   Any other symptom or condition that you feel may need urgent attention  Your doctor recommends these additional instructions:  If any biopsies were taken, your doctors clinic will contact you in 1 to 2   weeks with any results.  - Discharge patient to home.   - Stent diet.   - Perform a colonoscopy in 1 month.  For questions, problems or results please call your physician Nish Osorio MD at Work:  [phone]  If you have any questions about the above instructions, call the GI   department at [phone] or call the endoscopy unit at [phone]   from 7am until 3 pm.  [facility] [place], EMERGENCY ROOM PHONE NUMBER:   [phone]  IF A COMPLICATION OR EMERGENCY SITUATION ARISES AND YOU ARE UNABLE TO REACH   YOUR PHYSICIAN - GO DIRECTLY TO THE EMERGENCY ROOM.  I have read or have had read to me these discharge instructions for my   procedure and have received a written copy.  I understand these   instructions and will follow-up with my physician if I have any questions.     __________________________________       _____________________________________  Nurse Signature                                          Patient/Designated   Responsible Party Signature  MD Nish Velásquez MD  3/25/2022 10:01:48 AM  This report has been verified and signed electronically.  Dear patient,  As a result of recent federal legislation (The Federal Cures Act), you may   receive lab or pathology results from your procedure in your [facility]   account before your physician is able to  contact you. Your physician or   their representative will relay the results to you with their   recommendations at their soonest availability.  Thank you,  PROVATION

## 2022-03-25 NOTE — H&P
PRE PROCEDURE H&P    Patient Name: Rolly Swain  MRN: [id number]  : 1950  Date of Procedure:  3/25/2022  Referring Physician: Nish Osorio *  Primary Physician: Louise Ingram DO  Procedure Physician: Nish Osorio MD       Planned Procedure: Colonoscopy  Diagnosis: dilation and stent placement for an anastomotic stricture  Chief Complaint: Same as above    HPI: Patient is an 71 y.o. female is here for the above.     Risks and benefits of the procedure were discussed including the risks of bleeding, perforation, requiring surgery, risks related to anesthesia. The patient expressed understanding and agreed to proceed. No language barriers were present.       Past Medical History:   Past Medical History:   Diagnosis Date    Abnormal Pap smear of vagina     Contusion of left arm chronic pain    Depression     Diabetes mellitus type II     GERD (gastroesophageal reflux disease)     Hx SBO     Hyperlipidemia     Hypertension     Psoriatic arthritis         Past Surgical History:  Past Surgical History:   Procedure Laterality Date    COLONOSCOPY  2013    ESOPHAGOGASTRODUODENOSCOPY  2010    HYSTERECTOMY      SMALL INTESTINE SURGERY      2 small bowel resections  to SBO    SMALL INTESTINE SURGERY  2021    TONSILLECTOMY      TUBAL LIGATION          Home Medications:  Prior to Admission medications    Medication Sig Start Date End Date Taking? Authorizing Provider   atorvastatin (LIPITOR) 20 MG tablet Take 20 mg by mouth. 21  Yes Historical Provider   busPIRone (BUSPAR) 5 MG Tab Take 5 mg by mouth. 8/20/15 3/25/22 Yes Historical Provider   cholecalciferol, vitamin D3, 1,250 mcg (50,000 unit) Tab Take 1 tablet by mouth. 21  Yes Historical Provider   dulaglutide (TRULICITY) 1.5 mg/0.5 mL pen injector  21  Yes Historical Provider   fluticasone (FLONASE) 50 mcg/actuation nasal spray  17  Yes Historical Provider   glipiZIDE (GLUCOTROL) 5 MG tablet  Take 5 mg by mouth once daily. 11/13/21  Yes Historical Provider   hydrocodone-acetaminophen 10-325mg (NORCO)  mg Tab Take 1 tablet by mouth. 10/9/15  Yes Historical Provider   linaCLOtide (LINZESS) 145 mcg Cap capsule Take 145 mcg by mouth. 3/5/15  Yes Historical Provider   metformin (GLUCOPHAGE) 500 MG tablet Take 500 mg by mouth 2 (two) times daily with meals.   Yes Historical Provider   omeprazole (PRILOSEC) 40 MG capsule Take 1 capsule by mouth once daily. 8/25/16  Yes Historical Provider   pantoprazole (PROTONIX) 40 MG tablet Take 40 mg by mouth. 7/9/15 3/25/22 Yes Historical Provider   pregabalin (LYRICA) 75 MG capsule Take 75 mg by mouth 2 (two) times daily. 11/15/21  Yes Historical Provider   PROAIR HFA 90 mcg/actuation inhaler Take 2 puffs by mouth as needed. 9/21/16  Yes Historical Provider   promethazine (PHENERGAN) 25 MG tablet  10/30/17  Yes Historical Provider   risankizumab-rzaa (SKYRIZI) 150mg/1.66mL(75 mg/0.83 mL x2) subcutaneous injection  6/18/21  Yes Historical Provider   sertraline (ZOLOFT) 50 MG tablet  8/20/21  Yes Historical Provider   traZODone (DESYREL) 50 MG tablet  8/20/21  Yes Historical Provider   valACYclovir (VALTREX) 1000 MG tablet 1 po qd 7/12/16  Yes Historical Provider   valsartan-hydrochlorothiazide (DIOVAN-HCT) 320-25 mg per tablet Take 1 tablet by mouth once daily.   Yes Historical Provider   acyclovir 5% (ZOVIRAX) 5 % ointment Apply four times daily until resolved 10/26/17   Historical Provider   clobetasol (TEMOVATE) 0.05 % cream Apply topically. 7/9/15   Historical Provider   estradioL (VAGIFEM) 10 mcg Tab Place 1 tablet (10 mcg total) vaginally every evening for 14 days, THEN 1 tablet (10 mcg total) twice a week for 14 days. 12/13/21 1/10/22  Claudia Spring MD   insulin syringe-needle U-100 0.3 mL 29 gauge Syrg  10/13/17   Historical Provider   insulin syringe-needle U-100 1 mL 31 gauge x 5/16 Syrg USE ONCE DAILY 5/23/14   Historical Provider   insulin  syringe-needle U-100 1/2 mL 30 Syrg USE ONCE DAILY AS DIRECTED 6/3/15   Historical Provider   IXEKIZUMAB (TALTZ SYRINGE SUBQ) Inject into the skin. Every 4 weeks    Historical Provider   ixekizumab 80 mg/mL AtIn Inject into the skin.    Historical Provider   ixekizumab 80 mg/mL Syrg Inject 80 mg ( 1 injection) SQ every 4 weeks 5/8/17   Historical Provider   methotrexate 2.5 MG Tab Take 6 tablets by mouth once a week. 9/15/16   Historical Provider   mometasone (NASONEX) 50 mcg/actuation nasal spray 2 sprays by Nasal route 2 (two) times daily. 11/22/16   Historical Provider   mupirocin calcium 2% (BACTROBAN) 2 % cream Apply to affected area bid 12/18/15   Historical Provider   pramoxine-hydrocortisone cream Apply qid for itching.  Dispense the 2.5% cream 11/10/15   Historical Provider   TALTZ AUTOINJECTOR 80 mg/mL AtIn every 28 days. 11/10/16   Historical Provider   TOUJEO SOLOSTAR U-300 INSULIN 300 unit/mL (1.5 mL) InPn pen  1/27/18   Historical Provider   TREMFYA 100 mg/mL Syrg  10/11/17   Historical Provider   triamcinolone acetonide 0.1% (KENALOG) 0.1 % ointment 2 (two) times daily. 9/13/16   Historical Provider        Allergies:  Review of patient's allergies indicates:   Allergen Reactions    Demerol [meperidine]     Duloxetine     Latex, natural rubber     Meperidine (pf)         Social History:   Social History     Socioeconomic History    Marital status:     Number of children: 5   Tobacco Use    Smoking status: Never Smoker    Smokeless tobacco: Never Used   Substance and Sexual Activity    Alcohol use: No    Drug use: No    Sexual activity: Not Currently     Birth control/protection: Surgical       Family History:  Family History   Problem Relation Age of Onset    Hypertension Mother     Heart disease Mother     COPD Father     Heart disease Father     Heart disease Sister     Diabetes Sister     Heart disease Brother     Diabetes Brother     Heart disease Son     Diabetes Son   "   Hyperlipidemia Son     Hypertension Son        ROS: No acute cardiac events, no acute respiratory complaints.     Physical Exam (all patients):    /84   Pulse 66   Temp 97.7 °F (36.5 °C) (Temporal)   Resp 18   Ht 4' 11" (1.499 m)   Wt 59 kg (130 lb)   SpO2 99%   Breastfeeding No   BMI 26.26 kg/m²   Lungs: Clear to auscultation bilaterally, respirations unlabored  Heart: Regular rate and rhythm, S1 and S2 normal, no obvious murmurs  Abdomen:         Soft, non-tender, bowel sounds normal, no masses, no organomegaly    Lab Results   Component Value Date    WBC 8.99 02/22/2012    MCV 79.8 (L) 02/22/2012    RDW 14.9 (H) 02/22/2012     02/22/2012     (H) 02/22/2012    HGBA1C 9.8 (H) 10/10/2006    BUN 16 02/22/2012     02/22/2012    K 3.9 02/22/2012    CL 99 02/22/2012        SEDATION PLAN: per anesthesia      History reviewed, vital signs satisfactory, cardiopulmonary status satisfactory, sedation options, risks and plans have been discussed with the patient  All their questions were answered and the patient agrees to the sedation procedures as planned and the patient is deemed an appropriate candidate for the sedation as planned.    Procedure explained to patient, informed consent obtained and placed in chart.    Nish Osorio  3/25/2022  8:26 AM     "

## 2022-03-25 NOTE — ANESTHESIA POSTPROCEDURE EVALUATION
Anesthesia Post Evaluation    Patient: Rolly Swain    Procedure(s) Performed: Procedure(s) (LRB):  SIGMOIDOSCOPY, FLEXIBLE with stent placement and dilation (N/A)    Final Anesthesia Type: MAC      Patient location during evaluation: GI PACU  Patient participation: Yes- Able to Participate  Level of consciousness: awake and alert and oriented  Post-procedure vital signs: reviewed and stable  Pain management: adequate  Airway patency: patent  WOLFGANG mitigation strategies: Multimodal analgesia  PONV status at discharge: No PONV  Anesthetic complications: no      Cardiovascular status: hemodynamically stable  Respiratory status: unassisted, spontaneous ventilation and room air  Hydration status: euvolemic  Follow-up not needed.          Vitals Value Taken Time   /88 03/25/22 0956   Temp 36.4 °C (97.6 °F) 03/25/22 0956   Pulse 56 03/25/22 0956   Resp 16 03/25/22 0956   SpO2 100 % 03/25/22 0956         Event Time   Out of Recovery 09:57:44         Pain/Mesfin Score: Mesfin Score: 10 (3/25/2022  9:56 AM)

## 2022-03-29 LAB — POCT GLUCOSE: 91 MG/DL (ref 70–110)

## 2022-04-14 ENCOUNTER — TELEPHONE (OUTPATIENT)
Dept: GASTROENTEROLOGY | Facility: CLINIC | Age: 72
End: 2022-04-14
Payer: COMMERCIAL

## 2022-04-14 NOTE — TELEPHONE ENCOUNTER
----- Message from Queenie Burciaga sent at 4/14/2022  8:43 AM CDT -----  Regarding: appt request  Contact: pt  Pt requesting an appt on Monday for an appt for stomach pain. Pt states she has left message but hasn't heard from office. [phone]

## 2022-04-14 NOTE — TELEPHONE ENCOUNTER
Returned call and offered patient a Monday appt but she declined.  She stated she already had an appt on Monday.  She will call back when she gets home to her calendar.

## 2022-04-25 ENCOUNTER — TELEPHONE (OUTPATIENT)
Dept: GASTROENTEROLOGY | Facility: CLINIC | Age: 72
End: 2022-04-25
Payer: COMMERCIAL

## 2022-04-25 NOTE — TELEPHONE ENCOUNTER
----- Message from Angélica Schmitt sent at 4/25/2022 11:21 AM CDT -----  Contact: Patient  Patient would like a call back concerning her 04/26/22 appointment and what is going to happen at this appointment at   [phone]

## 2022-04-25 NOTE — TELEPHONE ENCOUNTER
I called the pt. back and an appt has been schedule for Friday 04/29/22 @ 3:30 with Dr. Villasenor.

## 2022-04-26 ENCOUNTER — OFFICE VISIT (OUTPATIENT)
Dept: GASTROENTEROLOGY | Facility: CLINIC | Age: 72
End: 2022-04-26
Payer: COMMERCIAL

## 2022-04-26 ENCOUNTER — HOSPITAL ENCOUNTER (OUTPATIENT)
Facility: HOSPITAL | Age: 72
Discharge: HOME HEALTH | End: 2022-04-28
Attending: EMERGENCY MEDICINE | Admitting: STUDENT IN AN ORGANIZED HEALTH CARE EDUCATION/TRAINING PROGRAM
Payer: COMMERCIAL

## 2022-04-26 VITALS
DIASTOLIC BLOOD PRESSURE: 90 MMHG | HEART RATE: 71 BPM | BODY MASS INDEX: 26.45 KG/M2 | OXYGEN SATURATION: 99 % | HEIGHT: 59 IN | SYSTOLIC BLOOD PRESSURE: 158 MMHG | WEIGHT: 131.19 LBS

## 2022-04-26 DIAGNOSIS — R14.0 ABDOMINAL DISTENSION: ICD-10-CM

## 2022-04-26 DIAGNOSIS — K91.30 COLORECTAL ANASTOMOTIC STRICTURE: Primary | ICD-10-CM

## 2022-04-26 DIAGNOSIS — K56.699 COLONIC STRICTURE: ICD-10-CM

## 2022-04-26 DIAGNOSIS — R10.84 GENERALIZED ABDOMINAL PAIN: Primary | ICD-10-CM

## 2022-04-26 DIAGNOSIS — R10.84 GENERALIZED ABDOMINAL PAIN: ICD-10-CM

## 2022-04-26 DIAGNOSIS — R11.2 NAUSEA & VOMITING: ICD-10-CM

## 2022-04-26 PROBLEM — K21.9 GASTROESOPHAGEAL REFLUX DISEASE: Status: ACTIVE | Noted: 2022-04-26

## 2022-04-26 PROBLEM — I10 HYPERTENSION: Status: ACTIVE | Noted: 2022-04-26

## 2022-04-26 PROBLEM — K59.04 CHRONIC IDIOPATHIC CONSTIPATION: Status: ACTIVE | Noted: 2022-04-16

## 2022-04-26 PROBLEM — E78.5 HYPERLIPIDEMIA: Status: ACTIVE | Noted: 2022-04-26

## 2022-04-26 PROBLEM — I10 PRIMARY HYPERTENSION: Chronic | Status: ACTIVE | Noted: 2022-04-26

## 2022-04-26 PROBLEM — M79.7 FIBROMYALGIA: Status: ACTIVE | Noted: 2022-04-26

## 2022-04-26 PROBLEM — L40.9 PSORIASIS: Status: ACTIVE | Noted: 2022-04-26

## 2022-04-26 PROBLEM — M79.18 MYOFASCIAL PAIN: Status: ACTIVE | Noted: 2022-04-26

## 2022-04-26 PROBLEM — L08.9 INFECTION OF SKIN AND SUBCUTANEOUS TISSUE: Status: ACTIVE | Noted: 2019-12-27

## 2022-04-26 PROBLEM — L40.50 PSORIATIC ARTHRITIS: Status: ACTIVE | Noted: 2022-04-26

## 2022-04-26 LAB
ALBUMIN SERPL BCP-MCNC: 4 G/DL (ref 3.5–5.2)
ALP SERPL-CCNC: 85 U/L (ref 55–135)
ALT SERPL W/O P-5'-P-CCNC: 19 U/L (ref 10–44)
ANION GAP SERPL CALC-SCNC: 12 MMOL/L (ref 8–16)
AST SERPL-CCNC: 20 U/L (ref 10–40)
BASOPHILS # BLD AUTO: 0.06 K/UL (ref 0–0.2)
BASOPHILS NFR BLD: 0.6 % (ref 0–1.9)
BILIRUB SERPL-MCNC: 0.5 MG/DL (ref 0.1–1)
BILIRUB UR QL STRIP: NEGATIVE
BUN SERPL-MCNC: 16 MG/DL (ref 8–23)
CALCIUM SERPL-MCNC: 9.8 MG/DL (ref 8.7–10.5)
CHLORIDE SERPL-SCNC: 100 MMOL/L (ref 95–110)
CLARITY UR: CLEAR
CO2 SERPL-SCNC: 27 MMOL/L (ref 23–29)
COLOR UR: YELLOW
CREAT SERPL-MCNC: 0.9 MG/DL (ref 0.5–1.4)
DIFFERENTIAL METHOD: ABNORMAL
EOSINOPHIL # BLD AUTO: 0.2 K/UL (ref 0–0.5)
EOSINOPHIL NFR BLD: 1.8 % (ref 0–8)
ERYTHROCYTE [DISTWIDTH] IN BLOOD BY AUTOMATED COUNT: 15.9 % (ref 11.5–14.5)
EST. GFR  (AFRICAN AMERICAN): >60 ML/MIN/1.73 M^2
EST. GFR  (NON AFRICAN AMERICAN): >60 ML/MIN/1.73 M^2
GLUCOSE SERPL-MCNC: 115 MG/DL (ref 70–110)
GLUCOSE UR QL STRIP: NEGATIVE
HCT VFR BLD AUTO: 34.9 % (ref 37–48.5)
HGB BLD-MCNC: 10.8 G/DL (ref 12–16)
HGB UR QL STRIP: NEGATIVE
IMM GRANULOCYTES # BLD AUTO: 0.02 K/UL (ref 0–0.04)
IMM GRANULOCYTES NFR BLD AUTO: 0.2 % (ref 0–0.5)
KETONES UR QL STRIP: NEGATIVE
LEUKOCYTE ESTERASE UR QL STRIP: ABNORMAL
LIPASE SERPL-CCNC: 18 U/L (ref 4–60)
LYMPHOCYTES # BLD AUTO: 2.6 K/UL (ref 1–4.8)
LYMPHOCYTES NFR BLD: 25.7 % (ref 18–48)
MCH RBC QN AUTO: 23.7 PG (ref 27–31)
MCHC RBC AUTO-ENTMCNC: 30.9 G/DL (ref 32–36)
MCV RBC AUTO: 77 FL (ref 82–98)
MICROSCOPIC COMMENT: NORMAL
MONOCYTES # BLD AUTO: 0.8 K/UL (ref 0.3–1)
MONOCYTES NFR BLD: 8.4 % (ref 4–15)
NEUTROPHILS # BLD AUTO: 6.3 K/UL (ref 1.8–7.7)
NEUTROPHILS NFR BLD: 63.3 % (ref 38–73)
NITRITE UR QL STRIP: NEGATIVE
NRBC BLD-RTO: 0 /100 WBC
PH UR STRIP: 6 [PH] (ref 5–8)
PLATELET # BLD AUTO: 433 K/UL (ref 150–450)
PMV BLD AUTO: 9.6 FL (ref 9.2–12.9)
POCT GLUCOSE: 109 MG/DL (ref 70–110)
POCT GLUCOSE: 128 MG/DL (ref 70–110)
POCT GLUCOSE: 87 MG/DL (ref 70–110)
POTASSIUM SERPL-SCNC: 3.8 MMOL/L (ref 3.5–5.1)
PROT SERPL-MCNC: 7.8 G/DL (ref 6–8.4)
PROT UR QL STRIP: NEGATIVE
RBC # BLD AUTO: 4.56 M/UL (ref 4–5.4)
SARS-COV-2 RDRP RESP QL NAA+PROBE: NEGATIVE
SODIUM SERPL-SCNC: 139 MMOL/L (ref 136–145)
SP GR UR STRIP: <=1.005 (ref 1–1.03)
SQUAMOUS #/AREA URNS HPF: 4 /HPF
URN SPEC COLLECT METH UR: ABNORMAL
UROBILINOGEN UR STRIP-ACNC: NEGATIVE EU/DL
WBC # BLD AUTO: 9.98 K/UL (ref 3.9–12.7)
WBC #/AREA URNS HPF: 3 /HPF (ref 0–5)

## 2022-04-26 PROCEDURE — 25000003 PHARM REV CODE 250: Performed by: STUDENT IN AN ORGANIZED HEALTH CARE EDUCATION/TRAINING PROGRAM

## 2022-04-26 PROCEDURE — 80053 COMPREHEN METABOLIC PANEL: CPT | Performed by: EMERGENCY MEDICINE

## 2022-04-26 PROCEDURE — 99285 EMERGENCY DEPT VISIT HI MDM: CPT | Mod: 25,27,CS

## 2022-04-26 PROCEDURE — 96361 HYDRATE IV INFUSION ADD-ON: CPT

## 2022-04-26 PROCEDURE — 99214 OFFICE O/P EST MOD 30 MIN: CPT | Mod: S$GLB,,, | Performed by: INTERNAL MEDICINE

## 2022-04-26 PROCEDURE — U0002 COVID-19 LAB TEST NON-CDC: HCPCS | Performed by: EMERGENCY MEDICINE

## 2022-04-26 PROCEDURE — G0378 HOSPITAL OBSERVATION PER HR: HCPCS

## 2022-04-26 PROCEDURE — 25000003 PHARM REV CODE 250: Performed by: EMERGENCY MEDICINE

## 2022-04-26 PROCEDURE — 99215 OFFICE O/P EST HI 40 MIN: CPT | Mod: PBBFAC,25 | Performed by: INTERNAL MEDICINE

## 2022-04-26 PROCEDURE — 96365 THER/PROPH/DIAG IV INF INIT: CPT

## 2022-04-26 PROCEDURE — 83690 ASSAY OF LIPASE: CPT | Performed by: EMERGENCY MEDICINE

## 2022-04-26 PROCEDURE — 96375 TX/PRO/DX INJ NEW DRUG ADDON: CPT

## 2022-04-26 PROCEDURE — 93005 ELECTROCARDIOGRAM TRACING: CPT

## 2022-04-26 PROCEDURE — 82962 GLUCOSE BLOOD TEST: CPT

## 2022-04-26 PROCEDURE — 93010 EKG 12-LEAD: ICD-10-PCS | Mod: ,,, | Performed by: INTERNAL MEDICINE

## 2022-04-26 PROCEDURE — 81000 URINALYSIS NONAUTO W/SCOPE: CPT | Performed by: EMERGENCY MEDICINE

## 2022-04-26 PROCEDURE — 99214 PR OFFICE/OUTPT VISIT, EST, LEVL IV, 30-39 MIN: ICD-10-PCS | Mod: S$GLB,,, | Performed by: INTERNAL MEDICINE

## 2022-04-26 PROCEDURE — 99999 PR PBB SHADOW E&M-EST. PATIENT-LVL V: ICD-10-PCS | Mod: PBBFAC,,, | Performed by: INTERNAL MEDICINE

## 2022-04-26 PROCEDURE — 85025 COMPLETE CBC W/AUTO DIFF WBC: CPT | Performed by: EMERGENCY MEDICINE

## 2022-04-26 PROCEDURE — 93010 ELECTROCARDIOGRAM REPORT: CPT | Mod: ,,, | Performed by: INTERNAL MEDICINE

## 2022-04-26 PROCEDURE — 99999 PR PBB SHADOW E&M-EST. PATIENT-LVL V: CPT | Mod: PBBFAC,,, | Performed by: INTERNAL MEDICINE

## 2022-04-26 PROCEDURE — 63600175 PHARM REV CODE 636 W HCPCS: Performed by: EMERGENCY MEDICINE

## 2022-04-26 PROCEDURE — 96376 TX/PRO/DX INJ SAME DRUG ADON: CPT

## 2022-04-26 RX ORDER — IBUPROFEN 200 MG
16 TABLET ORAL
Status: DISCONTINUED | OUTPATIENT
Start: 2022-04-26 | End: 2022-04-28 | Stop reason: HOSPADM

## 2022-04-26 RX ORDER — ALBUTEROL SULFATE 90 UG/1
2 AEROSOL, METERED RESPIRATORY (INHALATION) EVERY 6 HOURS PRN
COMMUNITY

## 2022-04-26 RX ORDER — FAMOTIDINE 10 MG/ML
20 INJECTION INTRAVENOUS
Status: COMPLETED | OUTPATIENT
Start: 2022-04-26 | End: 2022-04-26

## 2022-04-26 RX ORDER — IBUPROFEN 200 MG
24 TABLET ORAL
Status: DISCONTINUED | OUTPATIENT
Start: 2022-04-26 | End: 2022-04-28 | Stop reason: HOSPADM

## 2022-04-26 RX ORDER — NALOXONE HCL 0.4 MG/ML
0.02 VIAL (ML) INJECTION
Status: DISCONTINUED | OUTPATIENT
Start: 2022-04-26 | End: 2022-04-28 | Stop reason: HOSPADM

## 2022-04-26 RX ORDER — VALSARTAN 160 MG/1
320 TABLET ORAL DAILY
Status: DISCONTINUED | OUTPATIENT
Start: 2022-04-27 | End: 2022-04-28 | Stop reason: HOSPADM

## 2022-04-26 RX ORDER — ACETAMINOPHEN 325 MG/1
650 TABLET ORAL EVERY 6 HOURS PRN
Status: DISCONTINUED | OUTPATIENT
Start: 2022-04-26 | End: 2022-04-28 | Stop reason: HOSPADM

## 2022-04-26 RX ORDER — NYSTATIN 100000 [USP'U]/G
POWDER TOPICAL DAILY
COMMUNITY

## 2022-04-26 RX ORDER — SYRING-NEEDL,DISP,INSUL,0.3 ML 29 G X1/2"
296 SYRINGE, EMPTY DISPOSABLE MISCELLANEOUS
Status: COMPLETED | OUTPATIENT
Start: 2022-04-26 | End: 2022-04-26

## 2022-04-26 RX ORDER — INSULIN ASPART 100 [IU]/ML
0-5 INJECTION, SOLUTION INTRAVENOUS; SUBCUTANEOUS
Status: DISCONTINUED | OUTPATIENT
Start: 2022-04-26 | End: 2022-04-28 | Stop reason: HOSPADM

## 2022-04-26 RX ORDER — ONDANSETRON 2 MG/ML
4 INJECTION INTRAMUSCULAR; INTRAVENOUS
Status: COMPLETED | OUTPATIENT
Start: 2022-04-26 | End: 2022-04-26

## 2022-04-26 RX ORDER — MORPHINE SULFATE 4 MG/ML
4 INJECTION, SOLUTION INTRAMUSCULAR; INTRAVENOUS
Status: COMPLETED | OUTPATIENT
Start: 2022-04-26 | End: 2022-04-26

## 2022-04-26 RX ORDER — VALSARTAN AND HYDROCHLOROTHIAZIDE 320; 25 MG/1; MG/1
1 TABLET, FILM COATED ORAL DAILY
Status: DISCONTINUED | OUTPATIENT
Start: 2022-04-27 | End: 2022-04-26

## 2022-04-26 RX ORDER — HYDROCODONE BITARTRATE AND ACETAMINOPHEN 10; 325 MG/1; MG/1
1 TABLET ORAL EVERY 6 HOURS PRN
Status: DISCONTINUED | OUTPATIENT
Start: 2022-04-26 | End: 2022-04-28 | Stop reason: HOSPADM

## 2022-04-26 RX ORDER — TALC
6 POWDER (GRAM) TOPICAL NIGHTLY PRN
Status: DISCONTINUED | OUTPATIENT
Start: 2022-04-26 | End: 2022-04-28 | Stop reason: HOSPADM

## 2022-04-26 RX ORDER — SODIUM CHLORIDE 9 MG/ML
INJECTION, SOLUTION INTRAVENOUS CONTINUOUS
Status: ACTIVE | OUTPATIENT
Start: 2022-04-26 | End: 2022-04-27

## 2022-04-26 RX ORDER — PROMETHAZINE HYDROCHLORIDE 12.5 MG/1
25 TABLET ORAL EVERY 6 HOURS PRN
Status: DISCONTINUED | OUTPATIENT
Start: 2022-04-26 | End: 2022-04-27

## 2022-04-26 RX ORDER — PSEUDOEPHEDRINE/ACETAMINOPHEN 30MG-500MG
100 TABLET ORAL
Status: COMPLETED | OUTPATIENT
Start: 2022-04-26 | End: 2022-04-26

## 2022-04-26 RX ORDER — CLONIDINE HYDROCHLORIDE 0.2 MG/1
0.2 TABLET ORAL
Status: COMPLETED | OUTPATIENT
Start: 2022-04-26 | End: 2022-04-26

## 2022-04-26 RX ORDER — NYSTATIN 100000 U/G
CREAM TOPICAL 2 TIMES DAILY
COMMUNITY

## 2022-04-26 RX ORDER — CLONIDINE HYDROCHLORIDE 0.1 MG/1
0.1 TABLET ORAL EVERY 8 HOURS PRN
Status: DISCONTINUED | OUTPATIENT
Start: 2022-04-26 | End: 2022-04-28 | Stop reason: HOSPADM

## 2022-04-26 RX ORDER — SODIUM CHLORIDE 0.9 % (FLUSH) 0.9 %
10 SYRINGE (ML) INJECTION EVERY 12 HOURS PRN
Status: DISCONTINUED | OUTPATIENT
Start: 2022-04-26 | End: 2022-04-28 | Stop reason: HOSPADM

## 2022-04-26 RX ORDER — IPRATROPIUM BROMIDE AND ALBUTEROL SULFATE 2.5; .5 MG/3ML; MG/3ML
3 SOLUTION RESPIRATORY (INHALATION) EVERY 4 HOURS PRN
Status: DISCONTINUED | OUTPATIENT
Start: 2022-04-26 | End: 2022-04-28 | Stop reason: HOSPADM

## 2022-04-26 RX ORDER — MAG HYDROX/ALUMINUM HYD/SIMETH 200-200-20
30 SUSPENSION, ORAL (FINAL DOSE FORM) ORAL 4 TIMES DAILY PRN
Status: DISCONTINUED | OUTPATIENT
Start: 2022-04-26 | End: 2022-04-28 | Stop reason: HOSPADM

## 2022-04-26 RX ORDER — ONDANSETRON 2 MG/ML
4 INJECTION INTRAMUSCULAR; INTRAVENOUS EVERY 8 HOURS PRN
Status: DISCONTINUED | OUTPATIENT
Start: 2022-04-26 | End: 2022-04-28 | Stop reason: HOSPADM

## 2022-04-26 RX ORDER — HYDROCHLOROTHIAZIDE 25 MG/1
25 TABLET ORAL DAILY
Status: DISCONTINUED | OUTPATIENT
Start: 2022-04-27 | End: 2022-04-28 | Stop reason: HOSPADM

## 2022-04-26 RX ORDER — GLUCAGON 1 MG
1 KIT INJECTION
Status: DISCONTINUED | OUTPATIENT
Start: 2022-04-26 | End: 2022-04-28 | Stop reason: HOSPADM

## 2022-04-26 RX ADMIN — FAMOTIDINE 20 MG: 10 INJECTION INTRAVENOUS at 01:04

## 2022-04-26 RX ADMIN — MAGNESIUM CITRATE 296 ML: 1.75 LIQUID ORAL at 03:04

## 2022-04-26 RX ADMIN — MORPHINE SULFATE 4 MG: 4 INJECTION INTRAVENOUS at 04:04

## 2022-04-26 RX ADMIN — ONDANSETRON 4 MG: 2 INJECTION INTRAMUSCULAR; INTRAVENOUS at 01:04

## 2022-04-26 RX ADMIN — SODIUM CHLORIDE: 0.9 INJECTION, SOLUTION INTRAVENOUS at 11:04

## 2022-04-26 RX ADMIN — PROMETHAZINE HYDROCHLORIDE 25 MG: 25 INJECTION INTRAMUSCULAR; INTRAVENOUS at 03:04

## 2022-04-26 RX ADMIN — Medication 100 ML: at 03:04

## 2022-04-26 RX ADMIN — MORPHINE SULFATE 4 MG: 4 INJECTION INTRAVENOUS at 01:04

## 2022-04-26 RX ADMIN — CLONIDINE HYDROCHLORIDE 0.2 MG: 0.2 TABLET ORAL at 07:04

## 2022-04-26 RX ADMIN — SODIUM CHLORIDE, SODIUM LACTATE, POTASSIUM CHLORIDE, AND CALCIUM CHLORIDE 1000 ML: .6; .31; .03; .02 INJECTION, SOLUTION INTRAVENOUS at 01:04

## 2022-04-26 RX ADMIN — PROMETHAZINE HYDROCHLORIDE 25 MG: 12.5 TABLET ORAL at 11:04

## 2022-04-26 RX ADMIN — SODIUM CHLORIDE 500 ML: 9 INJECTION, SOLUTION INTRAVENOUS at 03:04

## 2022-04-26 NOTE — PROGRESS NOTES
Subjective:       Patient ID: Rolly Swain is a 71 y.o. female.    Chief Complaint: Abdominal Pain (Follow on Stinct that was put in by another provider), Bloated, and stomach swelling (Excessive Belching, pt. reported anything she eats makes her sick)    The patient is here for follow-up post placement of a sigmoid colons stent of anastomotic stricture performed on March 25th of this year.  The patient states she did well for about 2 weeks, but then began again with complaints of abdominal distension with generalized abdominal pain and nausea.  She states the plan was to put on a schedule of a series of 3 stents.  It is not presently known if the stent is occluded or if it has migrated.  The patient appears to be significantly uncomfortable with complaint of nausea exacerbated with attempts at p.o. intake.  Patient states she was in [facility] ED and review of this CT report in Care everywhere performed on April 21st suggested there was resolution of a picture of small bowel dilation suggesting obstruction which had been seen on a previous study done on April 15th.    Review of Systems   Constitutional: Positive for unexpected weight change. Negative for activity change, appetite change, chills, diaphoresis, fatigue and fever.   HENT: Negative for congestion, ear discharge, ear pain, hearing loss, nosebleeds, postnasal drip and tinnitus.    Eyes: Negative for photophobia and visual disturbance.   Respiratory: Negative for apnea, cough, choking, chest tightness, shortness of breath and wheezing.    Cardiovascular: Negative for chest pain, palpitations and leg swelling.   Gastrointestinal: Positive for abdominal distention, abdominal pain and nausea. Negative for anal bleeding, blood in stool, constipation, diarrhea, rectal pain and vomiting.        Bloating   Gas   Genitourinary: Negative for difficulty urinating, dyspareunia, dysuria, flank pain, frequency, hematuria, menstrual problem,  pelvic pain, urgency, vaginal bleeding and vaginal discharge.   Musculoskeletal: Positive for back pain and joint swelling. Negative for arthralgias, gait problem, myalgias and neck stiffness.        Joint stiffness   Skin: Positive for rash. Negative for pallor.   Neurological: Positive for headaches. Negative for dizziness, tremors, seizures, syncope, speech difficulty, weakness and numbness.   Hematological: Negative for adenopathy.   Psychiatric/Behavioral: Negative for agitation, confusion, hallucinations, sleep disturbance and suicidal ideas.       Objective:      Physical Exam  Vitals reviewed.   Constitutional:       Appearance: She is well-developed.   HENT:      Head: Normocephalic and atraumatic.   Eyes:      General: No scleral icterus.        Right eye: No discharge.         Left eye: No discharge.      Conjunctiva/sclera: Conjunctivae normal.      Pupils: Pupils are equal, round, and reactive to light.   Neck:      Thyroid: No thyromegaly.      Vascular: No JVD.   Cardiovascular:      Rate and Rhythm: Normal rate and regular rhythm.      Heart sounds: Normal heart sounds. No murmur heard.    No friction rub. No gallop.   Pulmonary:      Effort: Pulmonary effort is normal. No respiratory distress.      Breath sounds: Normal breath sounds. No wheezing or rales.   Chest:      Chest wall: No tenderness.   Abdominal:      General: Bowel sounds are normal. There is distension.      Palpations: Abdomen is soft. There is no mass.      Tenderness: There is abdominal tenderness. There is no guarding or rebound.   Musculoskeletal:         General: Normal range of motion.      Cervical back: Normal range of motion and neck supple.   Lymphadenopathy:      Cervical: No cervical adenopathy.   Skin:     General: Skin is warm and dry.      Coloration: Skin is not pale.      Findings: No erythema or rash.   Neurological:      Mental Status: She is alert and oriented to person, place, and time.      Motor: No abnormal  muscle tone.      Coordination: Coordination normal.      Deep Tendon Reflexes: Reflexes are normal and symmetric.   Psychiatric:         Behavior: Behavior normal.         Thought Content: Thought content normal.         Judgment: Judgment normal.         Assessment:   Colorectal anastomotic stricture    Abdominal distension    Generalized abdominal pain          Plan:   Discussed with advanced endoscopy team and she she will be sent to ED for KUB, to be followed by enemas in preparation for assessment site of stent placement.  ED physician has been notified as well as our GI hospital covering team.

## 2022-04-26 NOTE — ED PROVIDER NOTES
"SCRIBE #1 NOTE: I, Cesar Oates, am scribing for, and in the presence of, Karie Long MD. I have scribed the HPI, ROS, and PEx.     SCRIBE #2 NOTE: I, Natty Juárez, am scribing for, and in the presence of,  Elyssa Harrell MD. I have scribed the remaining portions of the note not scribed by Scribe #1.     History      Chief Complaint   Patient presents with    Abdominal Pain     Placement of a sigmoid colons stent on 3/25, seen about a week ago at [facility] for problems/possible SBO, now c/o difficulty eating without nausea, denies vomiting or diarrhea, states BM "aren't coming out right", LBM 2 days ago       Review of patient's allergies indicates:   Allergen Reactions    Demerol [meperidine]     Duloxetine     Latex, natural rubber         HPI   HPI    4/26/2022, 12:47 PM   History obtained from the patient      History of Present Illness: Rolly Swain is a 71 y.o. female patient who presents to the Emergency Department for generalized abdominal pain, onset 1 day PTA. Pt had a sigmoid colon stent placed on 3/25/22, and was admitted at [facility] last week for partial SBO. Pt was referred to the ED today by Dr. Villasenor (Gastroenterology) for further evaluation. Symptoms are constant and moderate in severity. Pain is worse with movement or after eating. Associated sxs include n/v and constipation x 3 days. Pt is still passing gas. Patient denies any fever, chills, SOB, CP, weakness, numbness, dizziness, headache, and all other sxs at this time. Pt is currently on phenergan, Protonix, and Omeprazole. No further complaints or concerns at this time.     Arrival mode: Personal vehicle     PCP: Louise Ingram DO       Past Medical History:  Past Medical History:   Diagnosis Date    Abnormal Pap smear of vagina 2013    Contusion of left arm chronic pain    Depression     Diabetes mellitus type II     GERD (gastroesophageal reflux disease)     Hx SBO     Hyperlipidemia     Hypertension     Psoriatic arthritis  "       Past Surgical History:  Past Surgical History:   Procedure Laterality Date    COLONOSCOPY  9/24/2013    ESOPHAGOGASTRODUODENOSCOPY  9/20/2010    FLEXIBLE SIGMOIDOSCOPY N/A 3/25/2022    Procedure: SIGMOIDOSCOPY, FLEXIBLE with stent placement and dilation;  Surgeon: Nish Osorio MD;  Location: [facility];  Service: Endoscopy;  Laterality: N/A;  Room 1, pediatric colon scope, flouro, contrast, wire, olympus stone retreival balloon, contrast, Axios 20 mm, kenalog, CRE dilation balloon all sizes.    HYSTERECTOMY      SMALL INTESTINE SURGERY      2 small bowel resections 2/2 to SBO    SMALL INTESTINE SURGERY  06/2021    TONSILLECTOMY      TUBAL LIGATION           Family History:  Family History   Problem Relation Age of Onset    Hypertension Mother     Heart disease Mother     COPD Father     Heart disease Father     Heart disease Sister     Diabetes Sister     Heart disease Brother     Diabetes Brother     Heart disease Son     Diabetes Son     Hyperlipidemia Son     Hypertension Son        Social History:  Social History     Tobacco Use    Smoking status: Never Smoker    Smokeless tobacco: Never Used   Substance and Sexual Activity    Alcohol use: No    Drug use: No    Sexual activity: Not Currently     Birth control/protection: Surgical       ROS   Review of Systems   Constitutional: Negative for chills and fever.   HENT: Negative for sore throat.    Respiratory: Negative for shortness of breath.    Cardiovascular: Negative for chest pain.   Gastrointestinal: Positive for abdominal pain (generalized), constipation, nausea and vomiting. Negative for diarrhea.   Genitourinary: Negative for dysuria.   Musculoskeletal: Negative for back pain.   Skin: Negative for rash.   Neurological: Negative for dizziness, weakness, light-headedness, numbness and headaches.   Hematological: Does not bruise/bleed easily.   All other systems reviewed and are negative.    Physical Exam      Initial  Vitals [04/26/22 1210]   BP Pulse Resp Temp SpO2   139/67 75 20 97.9 °F (36.6 °C) 99 %      MAP       --          Physical Exam  Nursing Notes and Vital Signs Reviewed.  Constitutional: Patient is in mild distress. Well-developed and well-nourished.  Head: Atraumatic. Normocephalic.  Eyes: PERRL. EOM intact. Conjunctivae are not pale. No scleral icterus.  ENT: Mucous membranes are moist. Oropharynx is clear and symmetric.    Neck: Supple. Full ROM.   Cardiovascular: Regular rate. Regular rhythm. No murmurs, rubs, or gallops. Distal pulses are 2+ and symmetric.  Pulmonary/Chest: No respiratory distress. Clear to auscultation bilaterally. No wheezing or rales.  Abdominal: Distended and firm.  There is no tenderness.  No rebound, guarding, or rigidity. Decreased bowel sounds.  Rectal: Female chaperone present for the duration of the rectal exam.There is no tenderness. Several external non-thrombosed hemorrhoids. Normal sphincter tone. Moderate amount of soft stool in the rectal vault; I was able to digitally disimpact some of the stool.  Musculoskeletal: Moves all extremities. No obvious deformities. No edema.  Skin: Warm and dry.  Neurological:  Alert, awake, and appropriate.  Normal speech.  No acute focal neurological deficits are appreciated.  Psychiatric: Anxious. Good eye contact. Appropriate in content.    ED Course    Procedures  ED Vital Signs:  Vitals:    04/26/22 1230 04/26/22 1305 04/26/22 1311 04/26/22 1604   BP: (!) 168/94      Pulse: 71 75     Resp: 20  20 20   Temp:       TempSrc:       SpO2: 100%      Weight:        04/26/22 1636 04/26/22 1710 04/26/22 1730 04/26/22 1830   BP: (!) 161/88 (!) 161/81 (!) 177/77 (!) 211/93   Pulse: 72 66 65 66   Resp: 20 (!) 21 20 20   Temp: 97.7 °F (36.5 °C)      TempSrc: Oral      SpO2: 100% 100% 100% 99%   Weight:        04/26/22 1959 04/26/22 2000 04/26/22 2030 04/26/22 2100   BP: (!) 177/82 (!) 201/85 (!) 159/72 106/67   Pulse:  78 72 84   Resp:  16 16    Temp:        TempSrc:       SpO2:  98% 100% 99%   Weight:        04/26/22 2118 04/26/22 2130 04/26/22 2351   BP:  (!) 101/59 (!) 102/55   Pulse:  78 73   Resp:  18 18   Temp: 98 °F (36.7 °C) 98.4 °F (36.9 °C) 97.6 °F (36.4 °C)   TempSrc:  Oral Axillary   SpO2:  98% 99%   Weight:   64.7 kg (142 lb 10.2 oz)       Abnormal Lab Results:  Labs Reviewed   CBC W/ AUTO DIFFERENTIAL - Abnormal; Notable for the following components:       Result Value    Hemoglobin 10.8 (*)     Hematocrit 34.9 (*)     MCV 77 (*)     MCH 23.7 (*)     MCHC 30.9 (*)     RDW 15.9 (*)     All other components within normal limits   COMPREHENSIVE METABOLIC PANEL - Abnormal; Notable for the following components:    Glucose 115 (*)     All other components within normal limits   URINALYSIS, REFLEX TO URINE CULTURE - Abnormal; Notable for the following components:    Specific Gravity, UA <=1.005 (*)     Leukocytes, UA 1+ (*)     All other components within normal limits    Narrative:     Specimen Source->Urine   LIPASE   SARS-COV-2 RNA AMPLIFICATION, QUAL   URINALYSIS MICROSCOPIC    Narrative:     Specimen Source->Urine   POCT GLUCOSE   POCT GLUCOSE        All Lab Results:  Results for orders placed or performed during the hospital encounter of 04/26/22   CBC W/ AUTO DIFFERENTIAL   Result Value Ref Range    WBC 9.98 3.90 - 12.70 K/uL    RBC 4.56 4.00 - 5.40 M/uL    Hemoglobin 10.8 (L) 12.0 - 16.0 g/dL    Hematocrit 34.9 (L) 37.0 - 48.5 %    MCV 77 (L) 82 - 98 fL    MCH 23.7 (L) 27.0 - 31.0 pg    MCHC 30.9 (L) 32.0 - 36.0 g/dL    RDW 15.9 (H) 11.5 - 14.5 %    Platelets 433 150 - 450 K/uL    MPV 9.6 9.2 - 12.9 fL    Immature Granulocytes 0.2 0.0 - 0.5 %    Gran # (ANC) 6.3 1.8 - 7.7 K/uL    Immature Grans (Abs) 0.02 0.00 - 0.04 K/uL    Lymph # 2.6 1.0 - 4.8 K/uL    Mono # 0.8 0.3 - 1.0 K/uL    Eos # 0.2 0.0 - 0.5 K/uL    Baso # 0.06 0.00 - 0.20 K/uL    nRBC 0 0 /100 WBC    Gran % 63.3 38.0 - 73.0 %    Lymph % 25.7 18.0 - 48.0 %    Mono % 8.4 4.0 - 15.0 %     Eosinophil % 1.8 0.0 - 8.0 %    Basophil % 0.6 0.0 - 1.9 %    Differential Method Automated    Comp. Metabolic Panel   Result Value Ref Range    Sodium 139 136 - 145 mmol/L    Potassium 3.8 3.5 - 5.1 mmol/L    Chloride 100 95 - 110 mmol/L    CO2 27 23 - 29 mmol/L    Glucose 115 (H) 70 - 110 mg/dL    BUN 16 8 - 23 mg/dL    Creatinine 0.9 0.5 - 1.4 mg/dL    Calcium 9.8 8.7 - 10.5 mg/dL    Total Protein 7.8 6.0 - 8.4 g/dL    Albumin 4.0 3.5 - 5.2 g/dL    Total Bilirubin 0.5 0.1 - 1.0 mg/dL    Alkaline Phosphatase 85 55 - 135 U/L    AST 20 10 - 40 U/L    ALT 19 10 - 44 U/L    Anion Gap 12 8 - 16 mmol/L    eGFR if African American >60 >60 mL/min/1.73 m^2    eGFR if non African American >60 >60 mL/min/1.73 m^2   Lipase   Result Value Ref Range    Lipase 18 4 - 60 U/L   Urinalysis, Reflex to Urine Culture Urine, Clean Catch    Specimen: Urine   Result Value Ref Range    Specimen UA Urine, Clean Catch     Color, UA Yellow Yellow, Straw, Mildred    Appearance, UA Clear Clear    pH, UA 6.0 5.0 - 8.0    Specific Gravity, UA <=1.005 (A) 1.005 - 1.030    Protein, UA Negative Negative    Glucose, UA Negative Negative    Ketones, UA Negative Negative    Bilirubin (UA) Negative Negative    Occult Blood UA Negative Negative    Nitrite, UA Negative Negative    Urobilinogen, UA Negative <2.0 EU/dL    Leukocytes, UA 1+ (A) Negative   COVID-19 Rapid Screening   Result Value Ref Range    SARS-CoV-2 RNA, Amplification, Qual Negative Negative   Urinalysis Microscopic   Result Value Ref Range    WBC, UA 3 0 - 5 /hpf    Squam Epithel, UA 4 /hpf    Microscopic Comment SEE COMMENT    POCT glucose   Result Value Ref Range    POCT Glucose 87 70 - 110 mg/dL   POCT glucose   Result Value Ref Range    POCT Glucose 109 70 - 110 mg/dL   POCT glucose   Result Value Ref Range    POCT Glucose 128 (H) 70 - 110 mg/dL     Imaging Results:  Imaging Results          CT Abdomen Pelvis  Without Contrast (Final result)  Result time 04/26/22 15:13:53    Final  result by Bossman Macdonald MD (04/26/22 15:13:53)                 Impression:      Evaluation of solid organ and vascular pathology is limited due to lack of IV contrast.  Consider follow-up CT with oral and IV contrast as clinically warranted.    Mild bilateral hydronephrosis and hydroureter without evidence of obstructing stone may reflect reflux disease.  No mass identified.  6 mm nonobstructing stone lower pole right kidney.    Moderate constipation. Anastomosis is seen within the distal sigmoid colon as well as within the left lower quadrant within what appears to be small bowel loops.  No definite obstructive pattern.    All CT scans at this facility use dose modulation, iterative reconstruction, and/or weight based dosing when appropriate to reduce radiation dose to as low as reasonable achievable.      Electronically signed by: Bossman Macdonald MD  Date:    04/26/2022  Time:    15:13             Narrative:    EXAMINATION:  CT ABDOMEN PELVIS WITHOUT CONTRAST    CLINICAL HISTORY:  Nausea/vomiting;    TECHNIQUE:  Low dose axial images, sagittal and coronal reformations were obtained from the lung bases to the pubic symphysis.  Oral contrast was not administered.    COMPARISON:  04/26/2022    FINDINGS:  Heart: Normal size. No effusion.    Lung Bases: Clear.  Calcified granuloma left lower lobe benign.    Liver: Normal size and attenuation. No focal lesions.    Gallbladder: No calcified gallstones.    Bile Ducts: No dilatation.    Pancreas: No obvious mass. No peripancreatic fat stranding.    Spleen: Normal.  Benign granuloma within the spleen.    Adrenals: Normal.    Kidneys/Ureters: Mild renal cortical thinning.  Mild bilateral hydronephrosis and hydroureter without evidence of obstructing stone may reflect reflux disease.  No mass identified.  6 mm nonobstructing stone lower pole right kidney.    Bladder: No wall thickening.  Mild bladder distension.    Reproductive organs: Surgically absent    GI  Tract/Mesentery: No evidence of bowel obstruction or inflammation.  Moderate constipation.  Anastomosis is seen within the distal sigmoid colon as well as within the left lower quadrant within what appears to be small bowel loops.  Appendix is not well seen.  Shotty mesenteric nodes which may be reactive.    Peritoneal Space: No ascites or free air.    Retroperitoneum: Shotty adenopathy.    Abdominal wall: Postoperative changes are seen within the midline of the abdominal wall.    Vasculature: No aneurysm. Aorta demonstrates atherosclerotic disease.    Bones: No acute fracture.  Mild degenerative changes.  High-density material seen within the disc space at T12/L1.  Heterogeneous marrow signal seen within the L3 and L4 vertebral body of unknown clinical significance.  Consider MRI for further evaluation.  No suspicious lytic or sclerotic lesions.                               X-Ray Abdomen Flat And Erect (Final result)  Result time 04/26/22 13:41:46    Final result by Yair Lees MD (04/26/22 13:41:46)                 Impression:      1.  Negative for acute process.    2.  Increased stool burden.    3.  Incidental findings as noted above.  This includes a possible 3 mm inferior pole nonobstructing right renal stone.      Electronically signed by: Yair Lees MD  Date:    04/26/2022  Time:    13:41             Narrative:    EXAMINATION:  XR ABDOMEN FLAT AND ERECT    CLINICAL HISTORY:  nausea vomiting;    TECHNIQUE:  Flat and erect AP views of the abdomen were performed.    COMPARISON:  None    FINDINGS:  Mildly increased stool burden.  Bowel-gas pattern is otherwise normal.  Negative for free air.  Mild degenerative changes of the spine and pelvis.  Lung bases are clear.  Bilateral buttock injection granulomas.  Postoperative changes overlie the pelvis.  There are phleboliths versus ureteroliths some overlying the pelvis.  Possible 3 mm inferior pole right renal stone.                               The EKG was  ordered, reviewed, and independently interpreted by the ED provider.  Interpretation time: 12:59  Rate: 74 BPM  Rhythm: normal sinus rhythm  Interpretation: No acute ST changes. No STEMI.           The Emergency Provider reviewed the vital signs and test results, which are outlined above.    ED Discussion     4:00 PM: Dr. Long transfers care of patient to Dr. Harrell pending Gastroenterology consult.    8:10 PM: Discussed pt's case with Dr. Nish Osorio MD (Gastroenterology) who recommends admitting for observation for IVF and possible flex sig with stent replacement. Keep NPO after midnight.    8:31 PM: Discussed case with Kandace Le NP (Hospital Medicine). Dr. Ann agrees with current care and management of pt and accepts admission.   Admitting Service: Hospital Medicine  Admitting Physician: Dr. Ann  Admit to: obs    8:31 PM: Re-evaluated pt. I have discussed test results, shared treatment plan, and the need for admission with patient and family at bedside. Pt and family express understanding at this time and agree with all information. All questions answered. Pt and family have no further questions or concerns at this time. Pt is ready for admit.           ED Medication(s):  Medications   HYDROcodone-acetaminophen  mg per tablet 1 tablet (has no administration in time range)   sodium chloride 0.9% flush 10 mL (has no administration in time range)   naloxone 0.4 mg/mL injection 0.02 mg (has no administration in time range)   glucose chewable tablet 16 g (has no administration in time range)   glucose chewable tablet 24 g (has no administration in time range)   glucagon (human recombinant) injection 1 mg (has no administration in time range)   cloNIDine tablet 0.1 mg (has no administration in time range)   albuterol-ipratropium 2.5 mg-0.5 mg/3 mL nebulizer solution 3 mL (has no administration in time range)   0.9%  NaCl infusion ( Intravenous New Bag 4/26/22 5258)   acetaminophen  tablet 650 mg (has no administration in time range)   ondansetron injection 4 mg (has no administration in time range)   promethazine tablet 25 mg (25 mg Oral Given 4/26/22 2310)   insulin aspart U-100 pen 0-5 Units (has no administration in time range)   melatonin tablet 6 mg (has no administration in time range)   aluminum-magnesium hydroxide-simethicone 200-200-20 mg/5 mL suspension 30 mL (has no administration in time range)   valsartan tablet 320 mg (has no administration in time range)     And   hydroCHLOROthiazide tablet 25 mg (has no administration in time range)   dextrose 10% bolus 125 mL (has no administration in time range)   dextrose 10% bolus 250 mL (has no administration in time range)   lactated ringers bolus 1,000 mL (0 mLs Intravenous Stopped 4/26/22 1414)   morphine injection 4 mg (4 mg Intravenous Given 4/26/22 1311)   ondansetron injection 4 mg (4 mg Intravenous Given 4/26/22 1311)   famotidine (PF) injection 20 mg (20 mg Intravenous Given 4/26/22 1309)   glycerin 99.5% topical solution 100 mL (100 mLs Rectal Given 4/26/22 1505)     And   magnesium citrate solution 296 mL (296 mLs Rectal Given 4/26/22 1505)     And   sodium chloride 0.9% bolus 500 mL (0 mLs Rectal Stopped 4/26/22 1530)   promethazine (PHENERGAN) 25 mg in dextrose 5 % 50 mL IVPB (0 mg Intravenous Stopped 4/26/22 1604)   morphine injection 4 mg (4 mg Intravenous Given 4/26/22 1604)   cloNIDine tablet 0.2 mg (0.2 mg Oral Given 4/26/22 1959)          Current Discharge Medication List            Medical Decision Making    Medical Decision Making:   Clinical Tests:   Lab Tests: Ordered and Reviewed  Radiological Study: Ordered and Reviewed  Medical Tests: Ordered and Reviewed           Scribe Attestation:   Scribe #1: I performed the above scribed service and the documentation accurately describes the services I performed. I attest to the accuracy of the note.    Attending:   Physician Attestation Statement for Scribe #1: Karie ZAVALA  MD Mercedes, personally performed the services described in this documentation, as scribed by Cesar Oates, in my presence, and it is both accurate and complete.       Scribe Attestation:   Scribe #2: I performed the above scribed service and the documentation accurately describes the services I performed. I attest to the accuracy of the note.    Attending Attestation:           Physician Attestation for Scribe:    Physician Attestation Statement for Scribe #2: I, Elyssa Harrell MD, reviewed documentation, as scribed by Natty Juárez in my presence, and it is both accurate and complete. I also acknowledge and confirm the content of the note done by Scribe #1.          Clinical Impression       ICD-10-CM ICD-9-CM   1. Generalized abdominal pain  R10.84 789.07   2. Nausea & vomiting  R11.2 787.01   3. Colonic stricture  K56.699 560.9       Disposition:   Disposition: Placed in Observation  Condition: Fair         Elyssa Harrell MD  04/27/22 0056

## 2022-04-26 NOTE — ED NOTES
Pt AAOx4, reports her abdominal pain is intermittent, side rails up x 2, call bell within reach. NAD at this time. Will continue to monitor.

## 2022-04-27 ENCOUNTER — ANESTHESIA EVENT (OUTPATIENT)
Dept: ENDOSCOPY | Facility: HOSPITAL | Age: 72
End: 2022-04-27
Payer: COMMERCIAL

## 2022-04-27 ENCOUNTER — ANESTHESIA (OUTPATIENT)
Dept: ENDOSCOPY | Facility: HOSPITAL | Age: 72
End: 2022-04-27
Payer: COMMERCIAL

## 2022-04-27 PROBLEM — K59.00 CONSTIPATION: Status: ACTIVE | Noted: 2022-04-27

## 2022-04-27 LAB
ALBUMIN SERPL BCP-MCNC: 3.2 G/DL (ref 3.5–5.2)
ALP SERPL-CCNC: 72 U/L (ref 55–135)
ALT SERPL W/O P-5'-P-CCNC: 17 U/L (ref 10–44)
ANION GAP SERPL CALC-SCNC: 8 MMOL/L (ref 8–16)
ANISOCYTOSIS BLD QL SMEAR: SLIGHT
AST SERPL-CCNC: 17 U/L (ref 10–40)
BASOPHILS # BLD AUTO: 0.05 K/UL (ref 0–0.2)
BASOPHILS NFR BLD: 0.6 % (ref 0–1.9)
BILIRUB SERPL-MCNC: 0.5 MG/DL (ref 0.1–1)
BUN SERPL-MCNC: 13 MG/DL (ref 8–23)
CALCIUM SERPL-MCNC: 8.9 MG/DL (ref 8.7–10.5)
CHLORIDE SERPL-SCNC: 106 MMOL/L (ref 95–110)
CO2 SERPL-SCNC: 27 MMOL/L (ref 23–29)
CREAT SERPL-MCNC: 0.9 MG/DL (ref 0.5–1.4)
DIFFERENTIAL METHOD: ABNORMAL
EOSINOPHIL # BLD AUTO: 0.2 K/UL (ref 0–0.5)
EOSINOPHIL NFR BLD: 2.5 % (ref 0–8)
ERYTHROCYTE [DISTWIDTH] IN BLOOD BY AUTOMATED COUNT: 16.1 % (ref 11.5–14.5)
EST. GFR  (AFRICAN AMERICAN): >60 ML/MIN/1.73 M^2
EST. GFR  (NON AFRICAN AMERICAN): >60 ML/MIN/1.73 M^2
GLUCOSE SERPL-MCNC: 93 MG/DL (ref 70–110)
HCT VFR BLD AUTO: 33.8 % (ref 37–48.5)
HGB BLD-MCNC: 9.9 G/DL (ref 12–16)
IMM GRANULOCYTES # BLD AUTO: 0.02 K/UL (ref 0–0.04)
IMM GRANULOCYTES NFR BLD AUTO: 0.2 % (ref 0–0.5)
LYMPHOCYTES # BLD AUTO: 2.4 K/UL (ref 1–4.8)
LYMPHOCYTES NFR BLD: 30 % (ref 18–48)
MAGNESIUM SERPL-MCNC: 1.9 MG/DL (ref 1.6–2.6)
MCH RBC QN AUTO: 23.6 PG (ref 27–31)
MCHC RBC AUTO-ENTMCNC: 29.3 G/DL (ref 32–36)
MCV RBC AUTO: 81 FL (ref 82–98)
MONOCYTES # BLD AUTO: 0.8 K/UL (ref 0.3–1)
MONOCYTES NFR BLD: 9.3 % (ref 4–15)
NEUTROPHILS # BLD AUTO: 4.7 K/UL (ref 1.8–7.7)
NEUTROPHILS NFR BLD: 57.4 % (ref 38–73)
NRBC BLD-RTO: 0 /100 WBC
PHOSPHATE SERPL-MCNC: 4 MG/DL (ref 2.7–4.5)
PLATELET # BLD AUTO: 417 K/UL (ref 150–450)
PLATELET BLD QL SMEAR: ABNORMAL
PMV BLD AUTO: 10.1 FL (ref 9.2–12.9)
POCT GLUCOSE: 72 MG/DL (ref 70–110)
POCT GLUCOSE: 91 MG/DL (ref 70–110)
POCT GLUCOSE: 95 MG/DL (ref 70–110)
POIKILOCYTOSIS BLD QL SMEAR: SLIGHT
POTASSIUM SERPL-SCNC: 4.1 MMOL/L (ref 3.5–5.1)
PROT SERPL-MCNC: 6.2 G/DL (ref 6–8.4)
RBC # BLD AUTO: 4.19 M/UL (ref 4–5.4)
SODIUM SERPL-SCNC: 141 MMOL/L (ref 136–145)
WBC # BLD AUTO: 8.13 K/UL (ref 3.9–12.7)

## 2022-04-27 PROCEDURE — 63600175 PHARM REV CODE 636 W HCPCS: Performed by: NURSE ANESTHETIST, CERTIFIED REGISTERED

## 2022-04-27 PROCEDURE — 37000008 HC ANESTHESIA 1ST 15 MINUTES: Performed by: INTERNAL MEDICINE

## 2022-04-27 PROCEDURE — 25000003 PHARM REV CODE 250: Performed by: STUDENT IN AN ORGANIZED HEALTH CARE EDUCATION/TRAINING PROGRAM

## 2022-04-27 PROCEDURE — 45332 PR SIGMOIDOSCOPY,REMVL F.B.: ICD-10-PCS | Mod: ,,, | Performed by: INTERNAL MEDICINE

## 2022-04-27 PROCEDURE — 97116 GAIT TRAINING THERAPY: CPT

## 2022-04-27 PROCEDURE — 25000003 PHARM REV CODE 250: Performed by: NURSE ANESTHETIST, CERTIFIED REGISTERED

## 2022-04-27 PROCEDURE — 63600175 PHARM REV CODE 636 W HCPCS: Performed by: STUDENT IN AN ORGANIZED HEALTH CARE EDUCATION/TRAINING PROGRAM

## 2022-04-27 PROCEDURE — 97161 PT EVAL LOW COMPLEX 20 MIN: CPT

## 2022-04-27 PROCEDURE — 27201012 HC FORCEPS, HOT/COLD, DISP: Performed by: INTERNAL MEDICINE

## 2022-04-27 PROCEDURE — 80053 COMPREHEN METABOLIC PANEL: CPT | Performed by: NURSE PRACTITIONER

## 2022-04-27 PROCEDURE — 97166 OT EVAL MOD COMPLEX 45 MIN: CPT

## 2022-04-27 PROCEDURE — 25000003 PHARM REV CODE 250: Performed by: NURSE PRACTITIONER

## 2022-04-27 PROCEDURE — 36415 COLL VENOUS BLD VENIPUNCTURE: CPT | Performed by: NURSE PRACTITIONER

## 2022-04-27 PROCEDURE — 45332 SIGMOIDOSCOPY W/FB REMOVAL: CPT | Mod: ,,, | Performed by: INTERNAL MEDICINE

## 2022-04-27 PROCEDURE — 85025 COMPLETE CBC W/AUTO DIFF WBC: CPT | Performed by: NURSE PRACTITIONER

## 2022-04-27 PROCEDURE — 63600175 PHARM REV CODE 636 W HCPCS: Performed by: NURSE PRACTITIONER

## 2022-04-27 PROCEDURE — 84100 ASSAY OF PHOSPHORUS: CPT | Performed by: NURSE PRACTITIONER

## 2022-04-27 PROCEDURE — 37000009 HC ANESTHESIA EA ADD 15 MINS: Performed by: INTERNAL MEDICINE

## 2022-04-27 PROCEDURE — 96361 HYDRATE IV INFUSION ADD-ON: CPT

## 2022-04-27 PROCEDURE — 96376 TX/PRO/DX INJ SAME DRUG ADON: CPT

## 2022-04-27 PROCEDURE — 83735 ASSAY OF MAGNESIUM: CPT | Performed by: NURSE PRACTITIONER

## 2022-04-27 PROCEDURE — G0378 HOSPITAL OBSERVATION PER HR: HCPCS

## 2022-04-27 PROCEDURE — 45332 SIGMOIDOSCOPY W/FB REMOVAL: CPT | Performed by: INTERNAL MEDICINE

## 2022-04-27 RX ORDER — LIDOCAINE HYDROCHLORIDE 10 MG/ML
INJECTION, SOLUTION EPIDURAL; INFILTRATION; INTRACAUDAL; PERINEURAL
Status: DISCONTINUED | OUTPATIENT
Start: 2022-04-27 | End: 2022-04-27

## 2022-04-27 RX ORDER — SODIUM CHLORIDE 9 MG/ML
INJECTION, SOLUTION INTRAVENOUS CONTINUOUS
Status: DISCONTINUED | OUTPATIENT
Start: 2022-04-27 | End: 2022-04-28 | Stop reason: HOSPADM

## 2022-04-27 RX ORDER — PROPOFOL 10 MG/ML
VIAL (ML) INTRAVENOUS
Status: DISCONTINUED | OUTPATIENT
Start: 2022-04-27 | End: 2022-04-27

## 2022-04-27 RX ORDER — ONDANSETRON 2 MG/ML
INJECTION INTRAMUSCULAR; INTRAVENOUS
Status: DISCONTINUED | OUTPATIENT
Start: 2022-04-27 | End: 2022-04-27

## 2022-04-27 RX ORDER — SODIUM CHLORIDE, SODIUM LACTATE, POTASSIUM CHLORIDE, CALCIUM CHLORIDE 600; 310; 30; 20 MG/100ML; MG/100ML; MG/100ML; MG/100ML
INJECTION, SOLUTION INTRAVENOUS CONTINUOUS PRN
Status: DISCONTINUED | OUTPATIENT
Start: 2022-04-27 | End: 2022-04-27

## 2022-04-27 RX ADMIN — ONDANSETRON 4 MG: 2 INJECTION INTRAMUSCULAR; INTRAVENOUS at 07:04

## 2022-04-27 RX ADMIN — PROPOFOL 50 MG: 10 INJECTION, EMULSION INTRAVENOUS at 04:04

## 2022-04-27 RX ADMIN — SODIUM CHLORIDE: 0.9 INJECTION, SOLUTION INTRAVENOUS at 07:04

## 2022-04-27 RX ADMIN — HYDROCODONE BITARTRATE AND ACETAMINOPHEN 1 TABLET: 10; 325 TABLET ORAL at 09:04

## 2022-04-27 RX ADMIN — SODIUM CHLORIDE, SODIUM LACTATE, POTASSIUM CHLORIDE, AND CALCIUM CHLORIDE: .6; .31; .03; .02 INJECTION, SOLUTION INTRAVENOUS at 04:04

## 2022-04-27 RX ADMIN — LIDOCAINE HYDROCHLORIDE 50 MG: 10 INJECTION, SOLUTION EPIDURAL; INFILTRATION; INTRACAUDAL; PERINEURAL at 04:04

## 2022-04-27 RX ADMIN — PROMETHAZINE HYDROCHLORIDE 12.5 MG: 25 INJECTION INTRAMUSCULAR; INTRAVENOUS at 05:04

## 2022-04-27 RX ADMIN — VALSARTAN 320 MG: 160 TABLET, FILM COATED ORAL at 08:04

## 2022-04-27 RX ADMIN — HYDROCHLOROTHIAZIDE 25 MG: 25 TABLET ORAL at 08:04

## 2022-04-27 RX ADMIN — HYDROCODONE BITARTRATE AND ACETAMINOPHEN 1 TABLET: 10; 325 TABLET ORAL at 02:04

## 2022-04-27 RX ADMIN — ONDANSETRON 4 MG: 2 INJECTION, SOLUTION INTRAMUSCULAR; INTRAVENOUS at 04:04

## 2022-04-27 NOTE — ASSESSMENT & PLAN NOTE
- Stent to sigmoid colon placed on 3/25 is no longer seen on imaging in the ED.   - GI consult. Plans for possible flex sig with stent replacement tomorrow. Will keep NPO after MN.    - IV hydration.

## 2022-04-27 NOTE — PT/OT/SLP EVAL
"Occupational Therapy   Evaluation and Discharge Note    Name: Rolly Swain  MRN: [id number]  Admitting Diagnosis:  Colonic stricture   Recent Surgery: Procedure(s) (LRB):  SIGMOIDOSCOPY, FLEXIBLE (N/A)      Recommendations:     Discharge Recommendations: home health OT (to ensure safe transition home)  Discharge Equipment Recommendations:  none  Barriers to discharge:  None    Assessment:     Rolly Swain is a 71 y.o. female with a medical diagnosis of Colonic stricture. At this time, patient is functioning at their prior level of function and does not require further acute OT services.     Plan:     During this hospitalization, patient does not require further acute OT services.  Please re-consult if situation changes.    · Plan of Care Reviewed with: patient    Subjective     Chief Complaint: Reported "I am just sad right now."  Patient/Family Comments/goals: none reported    Occupational Profile:  Living Environment: patient resides in a one story home with no steps to enter, with her  (works during the day).  Previous level of function: Patient was mod I with RW for ambulation and independent with ADLs. She does not drive.   Roles and Routines: n/a  Equipment Used at home:  walker, rolling, grab bar  Assistance upon Discharge:  and PRN A of children    Pain/Comfort:  · Pain Rating 1: 8/10  · Location 1: abdomen  · Pain Addressed 1:  (activity pacing)    Objective:     Communicated with: NurseJanel, prior to session.  Patient found supine with telemetry, peripheral IV upon OT entry to room.    General Precautions: Standard, fall   Orthopedic Precautions:N/A   Braces: N/A  Respiratory Status: Room air     Bed Mobility:    · Patient completed Supine to Sit with modified independence and with side rail    Functional Mobility/Transfers:  · Patient completed Sit <> Stand Transfer with modified independence  with  rolling walker   · Patient completed Bed <> Chair Transfer using Step Transfer " technique with modified independence with rolling walker  · Functional Mobility: Patient completed x180ft functional mobility with RW and SPV to mod I to promote OOB activity.    Activities of Daily Living:  · Grooming: modified independence .  · Upper Body Dressing: modified independence .    Cognitive/Visual Perceptual:  Cognitive/Psychosocial Skills:     -       Oriented to: Person, Place, Time and Situation   -       Follows Commands/attention:Follows two-step commands  -       Mood/Affect/Coping skills/emotional control: Tearful and Cooperative    Physical Exam:  Balance:    -       sitting: WFL, static standing: WFL, dynamic standing: good  Upper Extremity Range of Motion:     -       Right Upper Extremity: WFL  -       Left Upper Extremity: WFL  Upper Extremity Strength:    -       Right Upper Extremity: WFL  -       Left Upper Extremity: WFL   Strength:    -       Right Upper Extremity: WFL  -       Left Upper Extremity: WFL    AMPAC 6 Click ADL:  AMPAC Total Score: 24    Treatment & Education:  Patient educated on role of OT in acute setting and benefits of OOB activity. Stated understanding and in agreement with POC.  Education:    Patient left up in chair with all lines intact, call button in reach and nurse notified    History:     Past Medical History:   Diagnosis Date    Abnormal Pap smear of vagina 2013    Contusion of left arm chronic pain    Depression     Diabetes mellitus type II     GERD (gastroesophageal reflux disease)     Hx SBO     Hyperlipidemia     Hypertension     Psoriatic arthritis        Past Surgical History:   Procedure Laterality Date    COLONOSCOPY  9/24/2013    ESOPHAGOGASTRODUODENOSCOPY  9/20/2010    FLEXIBLE SIGMOIDOSCOPY N/A 3/25/2022    Procedure: SIGMOIDOSCOPY, FLEXIBLE with stent placement and dilation;  Surgeon: Nish Osorio MD;  Location: [facility];  Service: Endoscopy;  Laterality: N/A;  Room 1, pediatric colon scope, flouro, contrast, wire,  olympus stone retreival balloon, contrast, Axios 20 mm, kenalog, CRE dilation balloon all sizes.    HYSTERECTOMY      SMALL INTESTINE SURGERY      2 small bowel resections 2/2 to SBO    SMALL INTESTINE SURGERY  06/2021    TONSILLECTOMY      TUBAL LIGATION         Time Tracking:     OT Date of Treatment: 04/27/22  OT Start Time: 0900  OT Stop Time: 0915  OT Total Time (min): 15 min    Billable Minutes:Evaluation 15    4/27/2022

## 2022-04-27 NOTE — HOSPITAL COURSE
Per chart reiview, patient was seen by GI Dr Villasenor just yesterday post op sigmoid colons stent of anastomotic stricture on 3/25/22. Initially she did fine but began having the name symptoms. Plan was for a series of 3 stents. On 4/27/22,  CT abdomen negative for obstruction but showed mild hydronephrosis-non obstructing, moderate constipation. She was digitally disimpacted in the ED. GI consulted for flex sigmoid scope with stent placement pending per Dr Osorio this morning. On 4/28/22, flex sig performed with no stricture identified and symptoms likely attributed to adhesions.  Pt tolerated diet post procedure with vital signs stable and no signs of acute distress.  Case management consulted with home health established. Pt seen and examined and deemed stable for discharge to home. Medications reconciled. Pt instructed to follow up with PCP and established General Surgeon upon discharge for further evaluation.

## 2022-04-27 NOTE — ANESTHESIA PREPROCEDURE EVALUATION
04/27/2022  Rolly Swain is a 71 y.o., female.      Pre-op Assessment    I have reviewed the Patient Summary Reports.     I have reviewed the Nursing Notes. I have reviewed the NPO Status.   I have reviewed the Medications.     Review of Systems  Anesthesia Hx:  No problems with previous Anesthesia  Denies Family Hx of Anesthesia complications.   Denies Personal Hx of Anesthesia complications.   Social:  Non-Smoker, No Alcohol Use    Hematology/Oncology:     Oncology Normal    -- Anemia:   EENT/Dental:EENT/Dental Normal   Cardiovascular:   Hypertension, poorly controlled hyperlipidemia    Pulmonary:   Asthma    Renal/:  Renal/ Normal     Hepatic/GI:   Bowel Prep. GERD, well controlled    Musculoskeletal:  Muscle Disorders: Fibromyalgia  Spine Disorders: lumbar Chronic Pain    Neurological:  Neurology Normal    Endocrine:   Diabetes, well controlled    Dermatological:  Skin Normal    Psych:   Psychiatric History anxiety depression          Physical Exam  General: Well nourished, Cooperative, Alert and Oriented    Airway:  Mallampati: III   Mouth Opening: Small, but > 3cm  TM Distance: Normal  Tongue: Normal  Neck ROM: Normal ROM    Dental:  Dentures        Anesthesia Plan  Type of Anesthesia, risks & benefits discussed:    Anesthesia Type: MAC  Intra-op Monitoring Plan: Standard ASA Monitors  Post Op Pain Control Plan: IV/PO Opioids PRN  Informed Consent: Informed consent signed with the Patient and all parties understand the risks and agree with anesthesia plan.  All questions answered.   ASA Score: 3  Day of Surgery Review of History & Physical: H&P Update referred to the surgeon/provider.I have interviewed and examined the patient. I have reviewed the patient's H&P dated:     Ready For Surgery From Anesthesia Perspective.     .

## 2022-04-27 NOTE — PLAN OF CARE
OT paulina completed. Sup>sit mod I, sit>stand mod A, functional mobility x180ft with RW and SPV (at slow pace), and step>pivot to bedside chair with RW mod I. Recommending HHOT at d/c to ensure safe transition home. Continued acute services not warranted.

## 2022-04-27 NOTE — ASSESSMENT & PLAN NOTE
- Hold metformin and glipizide during hospital stay.   - Accuchecks with low dose SSI.  - Diabetic diet.

## 2022-04-27 NOTE — ASSESSMENT & PLAN NOTE
- Hold metformin and glipizide during hospital stay.   - Accuchecks with low dose SSI.  - Diabetic diet.    Patient's FSGs are uncontrolled due to hyperglycemia on current medication regimen.  Last A1c reviewed-   Lab Results   Component Value Date    HGBA1C 9.8 (H) 10/10/2006     Most recent fingerstick glucose reviewed-   Recent Labs   Lab 04/26/22  1957 04/26/22  2143 04/27/22  0603 04/27/22  1122   POCTGLUCOSE 109 128* 95 91     Current correctional scale  Low  Maintain anti-hyperglycemic dose as follows-   Antihyperglycemics (From admission, onward)            Start     Stop Route Frequency Ordered    04/26/22 2313  insulin aspart U-100 pen 0-5 Units         -- SubQ Before meals & nightly PRN 04/26/22 2214        Hold Oral hypoglycemics while patient is in the hospital.

## 2022-04-27 NOTE — PROGRESS NOTES
[facility]  [facility]  Progress Note    Patient Name: Rolly Swain  MRN: [id number]  Patient Class: OP- Observation   Admission Date: 4/26/2022  Length of Stay: 0 days  Attending Physician: Mervin Richardson MD  Primary Care Provider: Louise Ingram DO        Subjective:     Principal Problem:Colonic stricture        HPI:  Rolly Swain is a 71 y.o. female with a PMHx of DM II, GERD, h/o SBO/colonic stricture, HLD, and HTN who presented to the Emergency Department with c/o generalized abdominal pain, onset 1 day PTA. Pt had a sigmoid colon stent placed on 3/25/22, and was admitted at [facility] last week for partial SBO. Pt was referred to the ED today by Dr. Villasenor (Gastroenterology) for further evaluation. Symptoms are constant and moderate in severity. Pain is worse with movement or after eating. Associated nausea and constipation. Pt is still passing gas. Denies any fever, chills, ABD distention, diarrhea, vomiting, melena, hematochezia, dysuria, hematuria, back pain, SOB, CP, weakness, or change in appetite. Pt is currently on Phenergan and Protonix. Work-up in the ED showed unremarkable labs and UA. CT ABD/pelvis showed moderate constipation; anastomosis seen within the distal sigmoid colon as well as within the left lower quadrant within what appears to be small bowel loops, no definite obstructive pattern; no colonic stent seen. Patient was given brown bomb enema and digitally disimpacted in the ED. Case was discussed with GI per the ED, who will see patient in consult and possible flex sig with stent replacement tomorrow. Hospital Medicine was contacted for admission and patient placed in Observation.       Overview/Hospital Course:  Per chart reiview, patient was seen by GI Dr Villasenor just yesterday post op sigmoid colons stent of anastomotic stricture on 3/25/22. Initially she did fine but began having the name symptoms. Plan was for a series of 3 stents.       4/27: CT abdomen negative for obstruction  but showed mild hydronephrosis-non obstructing, moderate constipation. She was digitally disimpacted in the ED. GI consulted for flex sigmoid scope with stent placement pending per Dr Osorio this morning.       Interval History: Pending Flex sig and stent placement this AM    Review of Systems   Constitutional:  Negative for chills, diaphoresis, fatigue and fever.   Respiratory:  Negative for cough, shortness of breath and wheezing.    Cardiovascular:  Negative for chest pain, palpitations and leg swelling.   Gastrointestinal:  Positive for abdominal pain, constipation and nausea. Negative for abdominal distention, blood in stool, diarrhea and vomiting.   Genitourinary:  Negative for decreased urine volume, difficulty urinating, dysuria, flank pain, hematuria and pelvic pain.   Musculoskeletal:  Negative for arthralgias, back pain and myalgias.   Skin:  Negative for pallor and rash.   Neurological:  Negative for dizziness, syncope, weakness, light-headedness, numbness and headaches.   Psychiatric/Behavioral:  The patient is not nervous/anxious.    All other systems reviewed and are negative.  Objective:     Vital Signs (Most Recent):  Temp: 97.9 °F (36.6 °C) (04/27/22 1126)  Pulse: 64 (04/27/22 1126)  Resp: 18 (04/27/22 1126)  BP: (!) 104/55 (04/27/22 1126)  SpO2: 95 % (04/27/22 1126)   Vital Signs (24h Range):  Temp:  [97.6 °F (36.4 °C)-98.4 °F (36.9 °C)] 97.9 °F (36.6 °C)  Pulse:  [64-84] 64  Resp:  [16-21] 18  SpO2:  [95 %-100 %] 95 %  BP: (101-211)/(55-93) 104/55     Weight: 64.7 kg (142 lb 10.2 oz)  Body mass index is 28.81 kg/m².    Intake/Output Summary (Last 24 hours) at 4/27/2022 1356  Last data filed at 4/27/2022 0900  Gross per 24 hour   Intake 1549 ml   Output --   Net 1549 ml      Physical Exam  Vitals and nursing note reviewed.   Constitutional:       General: She is awake. She is not in acute distress.     Appearance: Normal appearance. She is well-developed. She is not diaphoretic.   HENT:       Head: Normocephalic and atraumatic.   Eyes:      Conjunctiva/sclera: Conjunctivae normal.      Comments: PERRL; EOM intact.   Cardiovascular:      Rate and Rhythm: Normal rate and regular rhythm. No extrasystoles are present.     Heart sounds: S1 normal and S2 normal. No murmur heard.  Pulmonary:      Effort: Pulmonary effort is normal. No tachypnea.      Breath sounds: Normal breath sounds and air entry. No wheezing, rhonchi or rales.   Abdominal:      General: Bowel sounds are decreased. There is distension (mild).      Palpations: Abdomen is soft.      Tenderness: There is generalized abdominal tenderness. There is no guarding or rebound.   Musculoskeletal:         General: No tenderness or deformity. Normal range of motion.      Cervical back: Normal range of motion and neck supple.      Right lower leg: No edema.      Left lower leg: No edema.   Skin:     General: Skin is warm and dry.      Capillary Refill: Capillary refill takes less than 2 seconds.      Findings: No erythema or rash.   Neurological:      General: No focal deficit present.      Mental Status: She is alert and oriented to person, place, and time.   Psychiatric:         Mood and Affect: Mood and affect normal.         Behavior: Behavior normal. Behavior is cooperative.       Significant Labs: All pertinent labs within the past 24 hours have been reviewed.    Significant Imaging: I have reviewed all pertinent imaging results/findings within the past 24 hours.      Assessment/Plan:      * Colonic stricture  - Stent to sigmoid colon placed on 3/25 is no longer seen on imaging in the ED.   - GI consult. Plans for possible flex sig with stent replacement tomorrow. Will keep NPO after MN.    - IV hydration.      4/27:  GI consulted for flex sigmoid scope with stent placement pending per Dr Osorio this morning.     Constipation  - Patient digitally disimpacted and given brown bomb enema in the ED.     Primary hypertension  - Continue home  antihypertensives.     Type 2 diabetes mellitus, without long-term current use of insulin  - Hold metformin and glipizide during hospital stay.   - Accuchecks with low dose SSI.  - Diabetic diet.    Patient's FSGs are uncontrolled due to hyperglycemia on current medication regimen.  Last A1c reviewed-   Lab Results   Component Value Date    HGBA1C 9.8 (H) 10/10/2006     Most recent fingerstick glucose reviewed-   Recent Labs   Lab 04/26/22  1957 04/26/22  2143 04/27/22  0603 04/27/22  1122   POCTGLUCOSE 109 128* 95 91     Current correctional scale  Low  Maintain anti-hyperglycemic dose as follows-   Antihyperglycemics (From admission, onward)            Start     Stop Route Frequency Ordered    04/26/22 2313  insulin aspart U-100 pen 0-5 Units         -- SubQ Before meals & nightly PRN 04/26/22 2214        Hold Oral hypoglycemics while patient is in the hospital.        VTE Risk Mitigation (From admission, onward)         Ordered     Reason for No Pharmacological VTE Prophylaxis  Once        Question:  Reasons:  Answer:  Risk of Bleeding    04/26/22 2214     IP VTE HIGH RISK PATIENT  Once         04/26/22 2214     Place sequential compression device  Until discontinued         04/26/22 2214                Discharge Planning   LYDIA:      Code Status: Full Code   Is the patient medically ready for discharge?:     Reason for patient still in hospital (select all that apply): Patient trending condition  Discharge Plan A: Home with family                  Chu Ann NP  Department of Hospital Medicine   [facility]

## 2022-04-27 NOTE — PLAN OF CARE
Plan of care reviewed with patient/family. Patint NPO after midnight. All questions answered til this point. No new questions as of now. Will continue care per unit policy and patient needs.

## 2022-04-27 NOTE — SUBJECTIVE & OBJECTIVE
Past Medical History:   Diagnosis Date    Abnormal Pap smear of vagina 2013    Contusion of left arm chronic pain    Depression     Diabetes mellitus type II     GERD (gastroesophageal reflux disease)     Hx SBO     Hyperlipidemia     Hypertension     Psoriatic arthritis        Past Surgical History:   Procedure Laterality Date    COLONOSCOPY  9/24/2013    ESOPHAGOGASTRODUODENOSCOPY  9/20/2010    FLEXIBLE SIGMOIDOSCOPY N/A 3/25/2022    Procedure: SIGMOIDOSCOPY, FLEXIBLE with stent placement and dilation;  Surgeon: Nish Osorio MD;  Location: [facility];  Service: Endoscopy;  Laterality: N/A;  Room 1, pediatric colon scope, flouro, contrast, wire, olympus stone retreival balloon, contrast, Axios 20 mm, kenalog, CRE dilation balloon all sizes.    HYSTERECTOMY      SMALL INTESTINE SURGERY      2 small bowel resections 2/2 to SBO    SMALL INTESTINE SURGERY  06/2021    TONSILLECTOMY      TUBAL LIGATION         Review of patient's allergies indicates:   Allergen Reactions    Demerol [meperidine]     Duloxetine     Latex, natural rubber        No current facility-administered medications on file prior to encounter.     Current Outpatient Medications on File Prior to Encounter   Medication Sig    albuterol (PROVENTIL/VENTOLIN HFA) 90 mcg/actuation inhaler Inhale 2 puffs into the lungs every 6 (six) hours as needed. Rescue    cholecalciferol, vitamin D3, 1,250 mcg (50,000 unit) Tab Take 1 tablet by mouth.    dulaglutide (TRULICITY) 1.5 mg/0.5 mL pen injector Inject 1.5 mg into the skin every 7 days.    fluticasone (FLONASE) 50 mcg/actuation nasal spray 1 spray by Each Nostril route 2 (two) times daily.    glipiZIDE (GLUCOTROL) 5 MG tablet Take 5 mg by mouth once daily.    hydrocodone-acetaminophen 10-325mg (NORCO)  mg Tab Take 1 tablet by mouth every 6 (six) hours as needed.    IXEKIZUMAB (TALTZ SYRINGE SUBQ) Inject into the skin. Every 4 weeks    metformin (GLUCOPHAGE) 500 MG tablet Take 500 mg by mouth 2  (two) times daily with meals.    nystatin (MYCOSTATIN) cream Apply topically 2 (two) times daily.    nystatin (MYCOSTATIN) powder Apply topically once daily. as directed    omeprazole (PRILOSEC) 40 MG capsule Take 40 mg by mouth once daily.    pantoprazole (PROTONIX) 40 MG tablet Take 40 mg by mouth once daily.    pregabalin (LYRICA) 100 MG capsule Take 100 mg by mouth 2 (two) times daily.    risankizumab-rzaa (SKYRIZI) 150mg/1.66mL(75 mg/0.83 mL x2) subcutaneous injection Inject 150 mg into the skin every 30 days.    valsartan-hydrochlorothiazide (DIOVAN-HCT) 320-25 mg per tablet Take 1 tablet by mouth once daily.    [DISCONTINUED] acyclovir 5% (ZOVIRAX) 5 % ointment Apply four times daily until resolved    [DISCONTINUED] atorvastatin (LIPITOR) 20 MG tablet Take 20 mg by mouth.    [DISCONTINUED] busPIRone (BUSPAR) 5 MG Tab Take 5 mg by mouth.    [DISCONTINUED] clobetasol (TEMOVATE) 0.05 % cream Apply topically.    [DISCONTINUED] estradioL (VAGIFEM) 10 mcg Tab Place 1 tablet (10 mcg total) vaginally every evening for 14 days, THEN 1 tablet (10 mcg total) twice a week for 14 days.    [DISCONTINUED] insulin syringe-needle U-100 0.3 mL 29 gauge Syrg     [DISCONTINUED] insulin syringe-needle U-100 1 mL 31 gauge x 5/16 Syrg USE ONCE DAILY    [DISCONTINUED] insulin syringe-needle U-100 1/2 mL 30 Syrg USE ONCE DAILY AS DIRECTED    [DISCONTINUED] ixekizumab 80 mg/mL AtIn Inject into the skin.    [DISCONTINUED] ixekizumab 80 mg/mL Syrg Inject 80 mg ( 1 injection) SQ every 4 weeks    [DISCONTINUED] linaCLOtide (LINZESS) 145 mcg Cap capsule Take 145 mcg by mouth.    [DISCONTINUED] methotrexate 2.5 MG Tab Take 6 tablets by mouth once a week.    [DISCONTINUED] mometasone (NASONEX) 50 mcg/actuation nasal spray 2 sprays by Nasal route 2 (two) times daily.    [DISCONTINUED] mupirocin calcium 2% (BACTROBAN) 2 % cream Apply to affected area bid    [DISCONTINUED] pramoxine-hydrocortisone cream Apply qid for itching.  Dispense the 2.5%  cream    [DISCONTINUED] PROAIR HFA 90 mcg/actuation inhaler Take 2 puffs by mouth as needed.    [DISCONTINUED] promethazine (PHENERGAN) 25 MG tablet     [DISCONTINUED] sertraline (ZOLOFT) 50 MG tablet     [DISCONTINUED] TALTZ AUTOINJECTOR 80 mg/mL AtIn every 28 days.    [DISCONTINUED] TOUJEO SOLOSTAR U-300 INSULIN 300 unit/mL (1.5 mL) InPn pen     [DISCONTINUED] traZODone (DESYREL) 50 MG tablet     [DISCONTINUED] TREMFYA 100 mg/mL Syrg     [DISCONTINUED] triamcinolone acetonide 0.1% (KENALOG) 0.1 % ointment 2 (two) times daily.    [DISCONTINUED] valACYclovir (VALTREX) 1000 MG tablet 1 po qd     Family History       Problem Relation (Age of Onset)    COPD Father    Diabetes Sister, Brother, Son    Heart disease Mother, Father, Sister, Brother, Son    Hyperlipidemia Son    Hypertension Mother, Son          Tobacco Use    Smoking status: Never Smoker    Smokeless tobacco: Never Used   Substance and Sexual Activity    Alcohol use: No    Drug use: No    Sexual activity: Not Currently     Birth control/protection: Surgical     Review of Systems   Constitutional:  Negative for chills, diaphoresis, fatigue and fever.   Respiratory:  Negative for cough, shortness of breath and wheezing.    Cardiovascular:  Negative for chest pain, palpitations and leg swelling.   Gastrointestinal:  Positive for abdominal pain, constipation and nausea. Negative for abdominal distention, blood in stool, diarrhea and vomiting.   Genitourinary:  Negative for decreased urine volume, difficulty urinating, dysuria, flank pain, hematuria and pelvic pain.   Musculoskeletal:  Negative for arthralgias, back pain and myalgias.   Skin:  Negative for pallor and rash.   Neurological:  Negative for dizziness, syncope, weakness, light-headedness, numbness and headaches.   Psychiatric/Behavioral:  The patient is not nervous/anxious.    All other systems reviewed and are negative.  Objective:     Vital Signs (Most Recent):  Temp: 98.4 °F (36.9 °C) (04/26/22  2130)  Pulse: 78 (04/26/22 2130)  Resp: 18 (04/26/22 2130)  BP: (!) 101/59 (04/26/22 2130)  SpO2: 98 % (04/26/22 2130)   Vital Signs (24h Range):  Temp:  [97.7 °F (36.5 °C)-98.4 °F (36.9 °C)] 98.4 °F (36.9 °C)  Pulse:  [65-84] 78  Resp:  [16-21] 18  SpO2:  [98 %-100 %] 98 %  BP: (101-211)/(59-94) 101/59        There is no height or weight on file to calculate BMI.    Physical Exam  Vitals and nursing note reviewed.   Constitutional:       General: She is awake. She is not in acute distress.     Appearance: Normal appearance. She is well-developed. She is not diaphoretic.   HENT:      Head: Normocephalic and atraumatic.   Eyes:      Conjunctiva/sclera: Conjunctivae normal.      Comments: PERRL; EOM intact.   Cardiovascular:      Rate and Rhythm: Normal rate and regular rhythm. No extrasystoles are present.     Heart sounds: S1 normal and S2 normal. No murmur heard.  Pulmonary:      Effort: Pulmonary effort is normal. No tachypnea.      Breath sounds: Normal breath sounds and air entry. No wheezing, rhonchi or rales.   Abdominal:      General: Bowel sounds are decreased. There is distension (mild).      Palpations: Abdomen is soft.      Tenderness: There is generalized abdominal tenderness. There is no guarding or rebound.   Musculoskeletal:         General: No tenderness or deformity. Normal range of motion.      Cervical back: Normal range of motion and neck supple.      Right lower leg: No edema.      Left lower leg: No edema.   Skin:     General: Skin is warm and dry.      Capillary Refill: Capillary refill takes less than 2 seconds.      Findings: No erythema or rash.   Neurological:      General: No focal deficit present.      Mental Status: She is alert and oriented to person, place, and time.   Psychiatric:         Mood and Affect: Mood and affect normal.         Behavior: Behavior normal. Behavior is cooperative.           Significant Labs:  Results for orders placed or performed during the hospital encounter  of 04/26/22   CBC W/ AUTO DIFFERENTIAL   Result Value Ref Range    WBC 9.98 3.90 - 12.70 K/uL    RBC 4.56 4.00 - 5.40 M/uL    Hemoglobin 10.8 (L) 12.0 - 16.0 g/dL    Hematocrit 34.9 (L) 37.0 - 48.5 %    MCV 77 (L) 82 - 98 fL    MCH 23.7 (L) 27.0 - 31.0 pg    MCHC 30.9 (L) 32.0 - 36.0 g/dL    RDW 15.9 (H) 11.5 - 14.5 %    Platelets 433 150 - 450 K/uL    MPV 9.6 9.2 - 12.9 fL    Immature Granulocytes 0.2 0.0 - 0.5 %    Gran # (ANC) 6.3 1.8 - 7.7 K/uL    Immature Grans (Abs) 0.02 0.00 - 0.04 K/uL    Lymph # 2.6 1.0 - 4.8 K/uL    Mono # 0.8 0.3 - 1.0 K/uL    Eos # 0.2 0.0 - 0.5 K/uL    Baso # 0.06 0.00 - 0.20 K/uL    nRBC 0 0 /100 WBC    Gran % 63.3 38.0 - 73.0 %    Lymph % 25.7 18.0 - 48.0 %    Mono % 8.4 4.0 - 15.0 %    Eosinophil % 1.8 0.0 - 8.0 %    Basophil % 0.6 0.0 - 1.9 %    Differential Method Automated    Comp. Metabolic Panel   Result Value Ref Range    Sodium 139 136 - 145 mmol/L    Potassium 3.8 3.5 - 5.1 mmol/L    Chloride 100 95 - 110 mmol/L    CO2 27 23 - 29 mmol/L    Glucose 115 (H) 70 - 110 mg/dL    BUN 16 8 - 23 mg/dL    Creatinine 0.9 0.5 - 1.4 mg/dL    Calcium 9.8 8.7 - 10.5 mg/dL    Total Protein 7.8 6.0 - 8.4 g/dL    Albumin 4.0 3.5 - 5.2 g/dL    Total Bilirubin 0.5 0.1 - 1.0 mg/dL    Alkaline Phosphatase 85 55 - 135 U/L    AST 20 10 - 40 U/L    ALT 19 10 - 44 U/L    Anion Gap 12 8 - 16 mmol/L    eGFR if African American >60 >60 mL/min/1.73 m^2    eGFR if non African American >60 >60 mL/min/1.73 m^2   Lipase   Result Value Ref Range    Lipase 18 4 - 60 U/L   Urinalysis, Reflex to Urine Culture Urine, Clean Catch    Specimen: Urine   Result Value Ref Range    Specimen UA Urine, Clean Catch     Color, UA Yellow Yellow, Straw, Mildred    Appearance, UA Clear Clear    pH, UA 6.0 5.0 - 8.0    Specific Gravity, UA <=1.005 (A) 1.005 - 1.030    Protein, UA Negative Negative    Glucose, UA Negative Negative    Ketones, UA Negative Negative    Bilirubin (UA) Negative Negative    Occult Blood UA Negative  Negative    Nitrite, UA Negative Negative    Urobilinogen, UA Negative <2.0 EU/dL    Leukocytes, UA 1+ (A) Negative   COVID-19 Rapid Screening   Result Value Ref Range    SARS-CoV-2 RNA, Amplification, Qual Negative Negative   Urinalysis Microscopic   Result Value Ref Range    WBC, UA 3 0 - 5 /hpf    Squam Epithel, UA 4 /hpf    Microscopic Comment SEE COMMENT    POCT glucose   Result Value Ref Range    POCT Glucose 87 70 - 110 mg/dL   POCT glucose   Result Value Ref Range    POCT Glucose 109 70 - 110 mg/dL   POCT glucose   Result Value Ref Range    POCT Glucose 128 (H) 70 - 110 mg/dL      All pertinent labs within the past 24 hours have been reviewed.    Significant Imaging:  Imaging Results              CT Abdomen Pelvis  Without Contrast (Final result)  Result time 04/26/22 15:13:53      Final result by Bossman Macdonald MD (04/26/22 15:13:53)                   Impression:      Evaluation of solid organ and vascular pathology is limited due to lack of IV contrast.  Consider follow-up CT with oral and IV contrast as clinically warranted.    Mild bilateral hydronephrosis and hydroureter without evidence of obstructing stone may reflect reflux disease.  No mass identified.  6 mm nonobstructing stone lower pole right kidney.    Moderate constipation. Anastomosis is seen within the distal sigmoid colon as well as within the left lower quadrant within what appears to be small bowel loops.  No definite obstructive pattern.    All CT scans at this facility use dose modulation, iterative reconstruction, and/or weight based dosing when appropriate to reduce radiation dose to as low as reasonable achievable.      Electronically signed by: Bossman Macdonald MD  Date:    04/26/2022  Time:    15:13               Narrative:    EXAMINATION:  CT ABDOMEN PELVIS WITHOUT CONTRAST    CLINICAL HISTORY:  Nausea/vomiting;    TECHNIQUE:  Low dose axial images, sagittal and coronal reformations were obtained from the lung bases to the pubic  symphysis.  Oral contrast was not administered.    COMPARISON:  04/26/2022    FINDINGS:  Heart: Normal size. No effusion.    Lung Bases: Clear.  Calcified granuloma left lower lobe benign.    Liver: Normal size and attenuation. No focal lesions.    Gallbladder: No calcified gallstones.    Bile Ducts: No dilatation.    Pancreas: No obvious mass. No peripancreatic fat stranding.    Spleen: Normal.  Benign granuloma within the spleen.    Adrenals: Normal.    Kidneys/Ureters: Mild renal cortical thinning.  Mild bilateral hydronephrosis and hydroureter without evidence of obstructing stone may reflect reflux disease.  No mass identified.  6 mm nonobstructing stone lower pole right kidney.    Bladder: No wall thickening.  Mild bladder distension.    Reproductive organs: Surgically absent    GI Tract/Mesentery: No evidence of bowel obstruction or inflammation.  Moderate constipation.  Anastomosis is seen within the distal sigmoid colon as well as within the left lower quadrant within what appears to be small bowel loops.  Appendix is not well seen.  Shotty mesenteric nodes which may be reactive.    Peritoneal Space: No ascites or free air.    Retroperitoneum: Shotty adenopathy.    Abdominal wall: Postoperative changes are seen within the midline of the abdominal wall.    Vasculature: No aneurysm. Aorta demonstrates atherosclerotic disease.    Bones: No acute fracture.  Mild degenerative changes.  High-density material seen within the disc space at T12/L1.  Heterogeneous marrow signal seen within the L3 and L4 vertebral body of unknown clinical significance.  Consider MRI for further evaluation.  No suspicious lytic or sclerotic lesions.                                       X-Ray Abdomen Flat And Erect (Final result)  Result time 04/26/22 13:41:46      Final result by Yair Lees MD (04/26/22 13:41:46)                   Impression:      1.  Negative for acute process.    2.  Increased stool burden.    3.  Incidental  findings as noted above.  This includes a possible 3 mm inferior pole nonobstructing right renal stone.      Electronically signed by: Yair Lees MD  Date:    04/26/2022  Time:    13:41               Narrative:    EXAMINATION:  XR ABDOMEN FLAT AND ERECT    CLINICAL HISTORY:  nausea vomiting;    TECHNIQUE:  Flat and erect AP views of the abdomen were performed.    COMPARISON:  None    FINDINGS:  Mildly increased stool burden.  Bowel-gas pattern is otherwise normal.  Negative for free air.  Mild degenerative changes of the spine and pelvis.  Lung bases are clear.  Bilateral buttock injection granulomas.  Postoperative changes overlie the pelvis.  There are phleboliths versus ureteroliths some overlying the pelvis.  Possible 3 mm inferior pole right renal stone.                                     I have reviewed all pertinent imaging results/findings within the past 24 hours.

## 2022-04-27 NOTE — ASSESSMENT & PLAN NOTE
- Stent to sigmoid colon placed on 3/25 is no longer seen on imaging in the ED.   - GI consult. Plans for possible flex sig with stent replacement tomorrow. Will keep NPO after MN.    - IV hydration.      4/27:  GI consulted for flex sigmoid scope with stent placement pending per Dr Osorio this morning.

## 2022-04-27 NOTE — SUBJECTIVE & OBJECTIVE
Past Medical History:   Diagnosis Date    Abnormal Pap smear of vagina 2013    Contusion of left arm chronic pain    Depression     Diabetes mellitus type II     GERD (gastroesophageal reflux disease)     Hx SBO     Hyperlipidemia     Hypertension     Psoriatic arthritis        Past Surgical History:   Procedure Laterality Date    COLONOSCOPY  9/24/2013    ESOPHAGOGASTRODUODENOSCOPY  9/20/2010    FLEXIBLE SIGMOIDOSCOPY N/A 3/25/2022    Procedure: SIGMOIDOSCOPY, FLEXIBLE with stent placement and dilation;  Surgeon: Nish Osorio MD;  Location: [facility];  Service: Endoscopy;  Laterality: N/A;  Room 1, pediatric colon scope, flouro, contrast, wire, olympus stone retreival balloon, contrast, Axios 20 mm, kenalog, CRE dilation balloon all sizes.    HYSTERECTOMY      SMALL INTESTINE SURGERY      2 small bowel resections 2/2 to SBO    SMALL INTESTINE SURGERY  06/2021    TONSILLECTOMY      TUBAL LIGATION         Review of patient's allergies indicates:   Allergen Reactions    Demerol [meperidine]     Duloxetine     Latex, natural rubber      Family History       Problem Relation (Age of Onset)    COPD Father    Diabetes Sister, Brother, Son    Heart disease Mother, Father, Sister, Brother, Son    Hyperlipidemia Son    Hypertension Mother, Son          Tobacco Use    Smoking status: Never Smoker    Smokeless tobacco: Never Used   Substance and Sexual Activity    Alcohol use: No    Drug use: No    Sexual activity: Not Currently     Birth control/protection: Surgical     Review of Systems   Constitutional:  Negative for diaphoresis, fatigue and fever.   Respiratory:  Negative for chest tightness and shortness of breath.    Cardiovascular:  Negative for chest pain.   Gastrointestinal:  Positive for abdominal distention, abdominal pain, constipation, nausea and vomiting. Negative for anal bleeding, blood in stool, diarrhea and rectal pain.   Musculoskeletal:  Negative for arthralgias and  back pain.   Objective:     Vital Signs (Most Recent):  Temp: 98.1 °F (36.7 °C) (04/27/22 1457)  Pulse: 70 (04/27/22 1655)  Resp: 19 (04/27/22 1655)  BP: (!) 158/69 (04/27/22 1655)  SpO2: 100 % (04/27/22 1655)   Vital Signs (24h Range):  Temp:  [97.6 °F (36.4 °C)-98.4 °F (36.9 °C)] 98.1 °F (36.7 °C)  Pulse:  [64-84] 70  Resp:  [16-20] 19  SpO2:  [95 %-100 %] 100 %  BP: (101-211)/(55-93) 158/69     Weight: 58.5 kg (129 lb) (04/27/22 1457)  Body mass index is 26.05 kg/m².      Intake/Output Summary (Last 24 hours) at 4/27/2022 1800  Last data filed at 4/27/2022 1635  Gross per 24 hour   Intake 250 ml   Output --   Net 250 ml       Lines/Drains/Airways       Peripheral Intravenous Line  Duration                  Peripheral IV - Single Lumen 04/26/22 1304 20 G Left Forearm 1 day                    Physical Exam  Constitutional:       Appearance: Normal appearance.   HENT:      Head: Normocephalic and atraumatic.   Eyes:      Extraocular Movements: Extraocular movements intact.      Pupils: Pupils are equal, round, and reactive to light.   Cardiovascular:      Rate and Rhythm: Normal rate and regular rhythm.      Pulses: Normal pulses.      Heart sounds: Normal heart sounds.   Pulmonary:      Effort: Pulmonary effort is normal.      Breath sounds: Normal breath sounds.   Abdominal:      General: Abdomen is flat. Bowel sounds are normal.      Palpations: Abdomen is soft.      Tenderness: There is abdominal tenderness.   Neurological:      General: No focal deficit present.      Mental Status: She is alert and oriented to person, place, and time.       Significant Labs:  Acute Hepatitis Panel: No results for input(s): HEPBSAG, HEPAIGM, HEPCAB in the last 48 hours.    Invalid input(s): HAPBIGM  Amylase: No results for input(s): AMYLASE in the last 48 hours.  Blood Culture: No results for input(s): LABBLOO in the last 48 hours.  CBC:   Recent Labs   Lab 04/26/22  1254 04/27/22  0601   WBC 9.98 8.13   HGB 10.8* 9.9*   HCT  34.9* 33.8*    417     BMP:   Recent Labs   Lab 04/27/22  0601   GLU 93      K 4.1      CO2 27   BUN 13   CREATININE 0.9   CALCIUM 8.9   MG 1.9     CMP:   Recent Labs   Lab 04/27/22  0601   GLU 93   CALCIUM 8.9   ALBUMIN 3.2*   PROT 6.2      K 4.1   CO2 27      BUN 13   CREATININE 0.9   ALKPHOS 72   ALT 17   AST 17   BILITOT 0.5     Coagulation: No results for input(s): PT, INR, APTT in the last 48 hours.  CRP: No results for input(s): CRP in the last 48 hours.  ESR: No results for input(s): SEDRATE in the last 48 hours.    Significant Imaging:  Imaging results within the past 24 hours have been reviewed.

## 2022-04-27 NOTE — [employer] PATIENT INSTRUCTIONS
Discharge Summary/Instructions after an Endoscopic Procedure  Patient Name: Rolly Swain  Patient MRN: [id number]  Patient YOB: 1950 Wednesday, April 27, 2022 Nish Osorio MD  Dear patient,  As a result of recent federal legislation (The Federal Cures Act), you may   receive lab or pathology results from your procedure in your [facility]   account before your physician is able to contact you. Your physician or   their representative will relay the results to you with their   recommendations at their soonest availability.  Thank you,  RESTRICTIONS:  During your procedure today, you received medications for sedation.  These   medications may affect your judgment, balance and coordination.  Therefore,   for 24 hours, you have the following restrictions:   - DO NOT drive a car, operate machinery, make legal/financial decisions,   sign important papers or drink alcohol.    ACTIVITY:  Today: no heavy lifting, straining or running due to procedural   sedation/anesthesia.  The following day: return to full activity including work.  DIET:  Eat and drink normally unless instructed otherwise.     TREATMENT FOR COMMON SIDE EFFECTS:  - Mild abdominal pain, nausea, belching, bloating or excessive gas:  rest,   eat lightly and use a heating pad.  - Sore Throat: treat with throat lozenges and/or gargle with warm salt   water.  - Because air was used during the procedure, expelling large amounts of air   from your rectum or belching is normal.  - If a bowel prep was taken, you may not have a bowel movement for 1-3 days.    This is normal.  SYMPTOMS TO WATCH FOR AND REPORT TO YOUR PHYSICIAN:  1. Abdominal pain or bloating, other than gas cramps.  2. Chest pain.  3. Back pain.  4. Signs of infection such as: chills or fever occurring within 24 hours   after the procedure.  5. Rectal bleeding, which would show as bright red, maroon, or black stools.   (A tablespoon of blood from the rectum is not serious,  especially if   hemorrhoids are present.)  6. Vomiting.  7. Weakness or dizziness.  GO DIRECTLY TO THE NEAREST EMERGENCY ROOM IF YOU HAVE ANY OF THE FOLLOWING:      Difficulty breathing              Chills and/or fever over 101 F   Persistent vomiting and/or vomiting blood   Severe abdominal pain   Severe chest pain   Black, tarry stools   Bleeding- more than one tablespoon   Any other symptom or condition that you feel may need urgent attention  Your doctor recommends these additional instructions:  If any biopsies were taken, your doctors clinic will contact you in 1 to 2   weeks with any results.  - Return patient to hospital aguilar for ongoing care.   - Resume previous diet.   - Continue present medications.   - Follow up with referring provider  For questions, problems or results please call your physician Nish Osorio MD at Work:  [phone]  If you have any questions about the above instructions, call the GI   department at [phone] or call the endoscopy unit at [phone]   from 7am until 3 pm.  [facility] [place], EMERGENCY ROOM PHONE NUMBER:   [phone]  IF A COMPLICATION OR EMERGENCY SITUATION ARISES AND YOU ARE UNABLE TO REACH   YOUR PHYSICIAN - GO DIRECTLY TO THE EMERGENCY ROOM.  I have read or have had read to me these discharge instructions for my   procedure and have received a written copy.  I understand these   instructions and will follow-up with my physician if I have any questions.     __________________________________       _____________________________________  Nurse Signature                                          Patient/Designated   Responsible Party Signature  MD Nish Velásquez MD  4/27/2022 5:53:12 PM  This report has been verified and signed electronically.  Dear patient,  As a result of recent federal legislation (The Federal Cures Act), you may   receive lab or pathology results from your procedure  in your [facility]   account before your physician is able to contact you. Your physician or   their representative will relay the results to you with their   recommendations at their soonest availability.  Thank you,  PROVATION

## 2022-04-27 NOTE — ANESTHESIA POSTPROCEDURE EVALUATION
Anesthesia Post Evaluation    Patient: Rolly Swain    Procedure(s) Performed: Procedure(s) (LRB):  SIGMOIDOSCOPY, FLEXIBLE (N/A)    Final Anesthesia Type: MAC      Patient location during evaluation: GI PACU  Patient participation: Yes- Able to Participate  Level of consciousness: awake and alert  Post-procedure vital signs: reviewed and stable  Pain management: adequate  Airway patency: patent    PONV status at discharge: No PONV  Anesthetic complications: no      Cardiovascular status: blood pressure returned to baseline and hemodynamically stable  Respiratory status: unassisted, spontaneous ventilation and room air  Hydration status: euvolemic  Follow-up not needed.          Vitals Value Taken Time   /78 04/27/22 1457   Temp 36.7 °C (98.1 °F) 04/27/22 1457   Pulse 66 04/27/22 1457   Resp 18 04/27/22 1457   SpO2 97 % 04/27/22 1457         No case tracking events are documented in the log.      Pain/Mesfin Score: Pain Rating Prior to Med Admin: 7 (4/27/2022  2:41 AM)  Pain Rating Post Med Admin: 4 (4/27/2022  3:06 AM)

## 2022-04-27 NOTE — PT/OT/SLP EVAL
Physical Therapy Evaluation and Discharge Note    Patient Name:  Rolly Swain   MRN:  [id number]    Recommendations:     Discharge Recommendations:  home health PT   Discharge Equipment Recommendations: none   Barriers to discharge: None    Assessment:     Rolly Swain is a 71 y.o. female admitted with a medical diagnosis of Colonic stricture. .  At this time, patient is functioning at their prior level of function and does not require further acute PT services.     Recent Surgery: Procedure(s) (LRB):  SIGMOIDOSCOPY, FLEXIBLE (N/A) Day of Surgery    Plan:     During this hospitalization, patient does not require further acute PT services.  Please re-consult if situation changes.      Subjective     Chief Complaint: BELLY PAIN   Patient/Family Comments/goals: FEEL BETTER   Pain/Comfort:  · Pain Rating 1: 8/10  · Location 1: abdomen  · Pain Rating Post-Intervention 1: 8/10    Patients cultural, spiritual, Sabianist conflicts given the current situation:      Living Environment:   PT LIVES AT HOME WITH  IN A ONE STORY HOME WITH NO STEPS   Prior to admission, patients level of function was MOD I WITH RW .  Equipment used at home:  .  DME owned (not currently used): none.  Upon discharge, patient will have assistance from  .    Objective:     Communicated with NURSE PATINO AND [facility] CHART REVIEW  prior to session.  Patient found supine with peripheral IV, telemetry upon PT entry to room.    General Precautions: Standard, fall   Orthopedic Precautions:N/A   Braces: N/A   Respiratory Status: Room air    Exams:  · Cognitive Exam:  Patient is oriented to Person, Place, Time and Situation  · RLE ROM: WFL  · RLE Strength: WFL  · LLE ROM: WFL  · LLE Strength: WFL    Functional Mobility:  · Bed Mobility:     · Supine to Sit: independence  · Sit to Supine: independence  · Transfers:     · Sit to Stand:  modified independence with rolling walker  · Bed to Chair: modified independence with  rolling walker  using   Stand Pivot  · Gait: 200' MOD I WITH RW     PT RETURNED TO RM T/F TO CHAIR WITH RW MOD I. PT EDUCATED ON ROLE OF PEOPLE MOVERS PROGRAM. PT LEFT SEATED WITH CALL BELL IN REACH.     AM-PAC 6 CLICK MOBILITY  Total Score:21     Patient left up in chair with call button in reach.    GOALS:   Multidisciplinary Problems     Physical Therapy Goals        Problem: Physical Therapy    Goal Priority Disciplines Outcome Goal Variances Interventions   Physical Therapy Goal     PT, PT/OT                      History:     Past Medical History:   Diagnosis Date    Abnormal Pap smear of vagina 2013    Contusion of left arm chronic pain    Depression     Diabetes mellitus type II     GERD (gastroesophageal reflux disease)     Hx SBO     Hyperlipidemia     Hypertension     Psoriatic arthritis        Past Surgical History:   Procedure Laterality Date    COLONOSCOPY  9/24/2013    ESOPHAGOGASTRODUODENOSCOPY  9/20/2010    FLEXIBLE SIGMOIDOSCOPY N/A 3/25/2022    Procedure: SIGMOIDOSCOPY, FLEXIBLE with stent placement and dilation;  Surgeon: Nish Osorio MD;  Location: [facility];  Service: Endoscopy;  Laterality: N/A;  Room 1, pediatric colon scope, flouro, contrast, wire, olympus stone retreival balloon, contrast, Axios 20 mm, kenalog, CRE dilation balloon all sizes.    HYSTERECTOMY      SMALL INTESTINE SURGERY      2 small bowel resections 2/2 to SBO    SMALL INTESTINE SURGERY  06/2021    TONSILLECTOMY      TUBAL LIGATION         Time Tracking:     PT Received On: 04/27/22  PT Start Time: 0900     PT Stop Time: 0930  PT Total Time (min): 30 min     Billable Minutes: Evaluation 20 and Gait Training 10      04/27/2022

## 2022-04-27 NOTE — ASSESSMENT & PLAN NOTE
Plan for flex sig with assessment and possible stent placement if still strictured. Otherwise her symptoms are most likely secondary to adhesions.

## 2022-04-27 NOTE — PLAN OF CARE
[facility] - Med Surg 3  Initial Discharge Assessment       Primary Care Provider: Louise Ingram DO    Admission Diagnosis: Generalized abdominal pain [R10.84]  Colonic stricture [K56.699]  Nausea & vomiting [R11.2]    Admission Date: 4/26/2022  Expected Discharge Date:     Discharge Barriers Identified: None    Payor: MEDICARE / Plan: MEDICARE PART A & B / Product Type: Government /     Extended Emergency Contact Information  Primary Emergency Contact: SwainThiagoCarlos  Address: [address] [place] [address] [place] [address] [facility]  Home Phone: [phone]  Mobile Phone: [phone]  Relation: Spouse    Discharge Plan A: Home with family  Discharge Plan B: Home with family, Home Health      [id number] - [place], [place] - [address] AT [facility] OF [address]  [address]  [place] [address]  Phone: [phone] Fax: [phone]    [facility]  [address] [facility] [address]  Phone: [phone] Fax: [phone]      Initial Assessment (most recent)     Adult Discharge Assessment - 04/27/22 0854        Discharge Assessment    Assessment Type Discharge Planning Assessment     Confirmed/corrected address, phone number and insurance Yes     Confirmed Demographics Correct on Facesheet     Source of Information patient     When was your last doctors appointment? 04/21/22     Communicated LYDIA with patient/caregiver Yes     Reason For Admission Stomach Pain     Lives With spouse     Facility Arrived From: Home     Do you expect to return to your current living situation? Yes     Do you have help at home or someone to help you manage your care at home? Yes     Who are your caregiver(s) and their phone number(s)? Carlos Swain, spouse [phone]     Prior to hospitilization cognitive status: Alert/Oriented     Current cognitive status: Alert/Oriented     Walking or Climbing Stairs Difficulty ambulation difficulty, requires equipment      Mobility Management Uses a rolling walker     Dressing/Bathing Difficulty none     Equipment Currently Used at Home walker, rolling     Readmission within 30 days? No     Patient currently being followed by outpatient case management? No     Do you currently have service(s) that help you manage your care at home? No     Do you take prescription medications? Yes     Do you have prescription coverage? Yes     Coverage BC     Do you have any problems affording any of your prescribed medications? No     Is the patient taking medications as prescribed? yes     Who is going to help you get home at discharge? Carlos     How do you get to doctors appointments? family or friend will provide     Are you on dialysis? No     Do you take coumadin? No     Discharge Plan A Home with family     Discharge Plan B Home with family;Home Health     DME Needed Upon Discharge  none     Discharge Plan discussed with: Patient     Discharge Barriers Identified None        Relationship/Environment    Name(s) of Who Lives With Patient Carlos Wiliam               CM met with patient at the bedside to assess for discharge needs.  Patient lives at home with her  and manages her own ADL's.  Patient stated that her  still works so she is home alone.  She does not drive and her  provides transportation.  PT/OT evaluations are ordered for recommendations of post acute needs.  CM will continue to follow for any discharge needs.  CM provided a transitional care folder, information on advanced directives, information on pharmacy bedside delivery, and discharge planning begins on admission with contact information for any needs/questions.

## 2022-04-27 NOTE — HPI
This is a 71 y.o. female here for abdominal pain.  She had a colonoscopy on  3/7/2022 showed a a cm stricture in the sigmoid that was not teraversable. EGD on 3/7/2022 that showed gastritis and biopsies obtianed. Food seen in the gastric cavity as well.      Surgery in June 2021 it was a sigmoid colectomy with Dr. Pk Groves.      Prior diverticulitis s/p resection she had a colonoscopy in 2016/2017 in August 2021 that showed sigmoid diverticulitis persistent inflammation and no obstruction.     She underwent flex sig on 3/25/2022 for treatment of the stricture via stent placement. A 15 mm LAMS was placed across the stricture. She did well after the procedure and her symptoms resolved. Until 1 week ago her abdominal pain, nausea, and non-bloody emesis occurred. CT at [facility] was concerning for SBO but no SBO seen. Stent was not seen at the anastomosis and she had passed it with BM. Sent to ER after being seen in clinic and CT showed no SBO and stent again not seen.

## 2022-04-27 NOTE — PHARMACY MED REC
"Admission Medication History     The home medication history was taken by Sam Morley.    You may go to "Admission" then "Reconcile Home Medications" tabs to review and/or act upon these items.      The home medication list has been updated by the Pharmacy department.    Please read ALL comments highlighted in yellow.    Please address this information as you see fit.     Feel free to contact us if you have any questions or require assistance.    Patient refused to speak with me during her interview, she was laying across her bed on her stomach crying. I reconciled her list using the records from her pharmacy and [employer] analytical software. While I'm confident the list is correct, last doses are unknown.  Removed a lot of old stuff from her list; please reorder if appropriate.     The medications listed below were removed from the home medication list. Please reorder if appropriate:  Patient reports no longer taking the following medication(s):   ACYCLOVIR 5 % TOPICAL OINTMENT   ATORVASTATIN 20 MG TABLET   BUSPIRONE 5 MG TABLET   CLOBETASOL 0.05 % TOPICAL CREAM   ESTRADIOL 10 MCG VAGINAL TABLETS   GUSELKUMAB (TREMFYA) 100 MG/mL PREFILLED SYRINGE   INSULIN GLARGINE (TOUJEO SOLOSTAR) U-300 UNIT/mL 1.5 mL INSULIN PEN   INSULIN SYRINGES   LINACLOTIDE 145 MCG  MCG   MOMETASONE 50 MCG/ACTUATION NASAL SPRAY   MUPIROCIN CALCIUM 2 % TOPICAL CREAM   PRAMOXINE-HYDROCORTISONE TOPICAL CREAM   PROMETHAZINE 25 MG TABLET   SERTRALINE 50 MG TABLET   TRAZODONE 50 MG TABLET   TRIAMCINOLONE ACETONIDE 0.1 % TOPICAL OINTMENT   VALACYCLOVIR 1 GRAM TABLET      Medications listed below were obtained from: Analytic software- [employer] and Patient's pharmacy  (Not in a hospital admission)      Potential issues to be addressed PRIOR TO DISCHARGE: NONE      Sam Morley CPhT  [employer] [phone]      Current Outpatient Medications on File Prior to Encounter   Medication Sig Dispense Refill Last Dose    " albuterol (PROVENTIL/VENTOLIN HFA) 90 mcg/actuation inhaler Inhale 2 puffs into the lungs every 6 (six) hours as needed. Rescue       cholecalciferol, vitamin D3, 1,250 mcg (50,000 unit) Tab Take 1 tablet by mouth.       dulaglutide (TRULICITY) 1.5 mg/0.5 mL pen injector Inject 1.5 mg into the skin every 7 days.       fluticasone (FLONASE) 50 mcg/actuation nasal spray 1 spray by Each Nostril route 2 (two) times daily.       glipiZIDE (GLUCOTROL) 5 MG tablet Take 5 mg by mouth once daily.       hydrocodone-acetaminophen 10-325mg (NORCO)  mg Tab Take 1 tablet by mouth every 6 (six) hours as needed.       IXEKIZUMAB (TALTZ SYRINGE SUBQ) Inject into the skin. Every 4 weeks       metformin (GLUCOPHAGE) 500 MG tablet Take 500 mg by mouth 2 (two) times daily with meals.       nystatin (MYCOSTATIN) cream Apply topically 2 (two) times daily.       nystatin (MYCOSTATIN) powder Apply topically once daily. as directed       omeprazole (PRILOSEC) 40 MG capsule Take 40 mg by mouth once daily.       pantoprazole (PROTONIX) 40 MG tablet Take 40 mg by mouth once daily.       pregabalin (LYRICA) 100 MG capsule Take 100 mg by mouth 2 (two) times daily.       risankizumab-rzaa (SKYRIZI) 150mg/1.66mL(75 mg/0.83 mL x2) subcutaneous injection Inject 150 mg into the skin every 30 days.       valsartan-hydrochlorothiazide (DIOVAN-HCT) 320-25 mg per tablet Take 1 tablet by mouth once daily.       [DISCONTINUED] acyclovir 5% (ZOVIRAX) 5 % ointment Apply four times daily until resolved       [DISCONTINUED] atorvastatin (LIPITOR) 20 MG tablet Take 20 mg by mouth.       [DISCONTINUED] busPIRone (BUSPAR) 5 MG Tab Take 5 mg by mouth.       [DISCONTINUED] clobetasol (TEMOVATE) 0.05 % cream Apply topically.       [DISCONTINUED] estradioL (VAGIFEM) 10 mcg Tab Place 1 tablet (10 mcg total) vaginally every evening for 14 days, THEN 1 tablet (10 mcg total) twice a week for 14 days. 18 tablet 6     [DISCONTINUED] insulin  syringe-needle U-100 0.3 mL 29 gauge Syrg        [DISCONTINUED] insulin syringe-needle U-100 1 mL 31 gauge x 5/16 Syrg USE ONCE DAILY       [DISCONTINUED] insulin syringe-needle U-100 1/2 mL 30 Syrg USE ONCE DAILY AS DIRECTED       [DISCONTINUED] ixekizumab 80 mg/mL AtIn Inject into the skin.       [DISCONTINUED] ixekizumab 80 mg/mL Syrg Inject 80 mg ( 1 injection) SQ every 4 weeks       [DISCONTINUED] linaCLOtide (LINZESS) 145 mcg Cap capsule Take 145 mcg by mouth.       [DISCONTINUED] methotrexate 2.5 MG Tab Take 6 tablets by mouth once a week.       [DISCONTINUED] mometasone (NASONEX) 50 mcg/actuation nasal spray 2 sprays by Nasal route 2 (two) times daily.  1     [DISCONTINUED] mupirocin calcium 2% (BACTROBAN) 2 % cream Apply to affected area bid       [DISCONTINUED] pramoxine-hydrocortisone cream Apply qid for itching.  Dispense the 2.5% cream       [DISCONTINUED] PROAIR HFA 90 mcg/actuation inhaler Take 2 puffs by mouth as needed.  0     [DISCONTINUED] promethazine (PHENERGAN) 25 MG tablet        [DISCONTINUED] sertraline (ZOLOFT) 50 MG tablet        [DISCONTINUED] TALTZ AUTOINJECTOR 80 mg/mL AtIn every 28 days.       [DISCONTINUED] TOUJEO SOLOSTAR U-300 INSULIN 300 unit/mL (1.5 mL) InPn pen        [DISCONTINUED] traZODone (DESYREL) 50 MG tablet        [DISCONTINUED] TREMFYA 100 mg/mL Syrg        [DISCONTINUED] triamcinolone acetonide 0.1% (KENALOG) 0.1 % ointment 2 (two) times daily.       [DISCONTINUED] valACYclovir (VALTREX) 1000 MG tablet 1 po qd                                .

## 2022-04-27 NOTE — H&P
PRE PROCEDURE H&P    Patient Name: Rolly Swain  MRN: [id number]  : 1950  Date of Procedure:  2022  Referring Physician: Self, Aaareferral  Primary Physician: Louise Ingram DO  Procedure Physician: Nish Osorio MD       Planned Procedure: Flexible Sigmoidoscopy  Diagnosis: anastomostic stricture stent placement  Chief Complaint: Same as above    HPI: Patient is an 71 y.o. female is here for the above.   Risks and benefits of the procedure were discussed including the risks of bleeding, perforation, requiring surgery, risks related to anesthesia. The patient expressed understanding and agreed to proceed. No language barriers were present.       Past Medical History:   Past Medical History:   Diagnosis Date    Abnormal Pap smear of vagina     Contusion of left arm chronic pain    Depression     Diabetes mellitus type II     GERD (gastroesophageal reflux disease)     Hx SBO     Hyperlipidemia     Hypertension     Psoriatic arthritis         Past Surgical History:  Past Surgical History:   Procedure Laterality Date    COLONOSCOPY  2013    ESOPHAGOGASTRODUODENOSCOPY  2010    FLEXIBLE SIGMOIDOSCOPY N/A 3/25/2022    Procedure: SIGMOIDOSCOPY, FLEXIBLE with stent placement and dilation;  Surgeon: Nsih Osorio MD;  Location: [facility];  Service: Endoscopy;  Laterality: N/A;  Room 1, pediatric colon scope, flouro, contrast, wire, olympus stone retreival balloon, contrast, Axios 20 mm, kenalog, CRE dilation balloon all sizes.    HYSTERECTOMY      SMALL INTESTINE SURGERY      2 small bowel resections / to SBO    SMALL INTESTINE SURGERY  2021    TONSILLECTOMY      TUBAL LIGATION          Home Medications:  Prior to Admission medications    Medication Sig Start Date End Date Taking? Authorizing Provider   albuterol (PROVENTIL/VENTOLIN HFA) 90 mcg/actuation inhaler Inhale 2 puffs into the lungs every 6 (six) hours as needed. Rescue    Historical Provider    cholecalciferol, vitamin D3, 1,250 mcg (50,000 unit) Tab Take 1 tablet by mouth. 4/28/21   Historical Provider   dulaglutide (TRULICITY) 1.5 mg/0.5 mL pen injector Inject 1.5 mg into the skin every 7 days. 7/17/21   Historical Provider   fluticasone (FLONASE) 50 mcg/actuation nasal spray 1 spray by Each Nostril route 2 (two) times daily. 9/20/17   Historical Provider   glipiZIDE (GLUCOTROL) 5 MG tablet Take 5 mg by mouth once daily. 11/13/21   Historical Provider   hydrocodone-acetaminophen 10-325mg (NORCO)  mg Tab Take 1 tablet by mouth every 6 (six) hours as needed. 10/9/15   Historical Provider   IXEKIZUMAB (TALTZ SYRINGE SUBQ) Inject into the skin. Every 4 weeks    Historical Provider   metformin (GLUCOPHAGE) 500 MG tablet Take 500 mg by mouth 2 (two) times daily with meals.    Historical Provider   nystatin (MYCOSTATIN) cream Apply topically 2 (two) times daily.    Historical Provider   nystatin (MYCOSTATIN) powder Apply topically once daily. as directed    Historical Provider   omeprazole (PRILOSEC) 40 MG capsule Take 40 mg by mouth once daily. 8/25/16   Historical Provider   pantoprazole (PROTONIX) 40 MG tablet Take 40 mg by mouth once daily. 7/9/15 3/25/22  Historical Provider   pregabalin (LYRICA) 100 MG capsule Take 100 mg by mouth 2 (two) times daily. 11/15/21   Historical Provider   risankizumab-rzaa (SKYRIZI) 150mg/1.66mL(75 mg/0.83 mL x2) subcutaneous injection Inject 150 mg into the skin every 30 days. 6/18/21   Historical Provider   valsartan-hydrochlorothiazide (DIOVAN-HCT) 320-25 mg per tablet Take 1 tablet by mouth once daily.    Historical Provider        Allergies:  Review of patient's allergies indicates:   Allergen Reactions    Demerol [meperidine]     Duloxetine     Latex, natural rubber         Social History:   Social History     Socioeconomic History    Marital status:     Number of children: 5   Tobacco Use    Smoking status: Never Smoker    Smokeless tobacco: Never  "Used   Substance and Sexual Activity    Alcohol use: No    Drug use: No    Sexual activity: Not Currently     Birth control/protection: Surgical       Family History:  Family History   Problem Relation Age of Onset    Hypertension Mother     Heart disease Mother     COPD Father     Heart disease Father     Heart disease Sister     Diabetes Sister     Heart disease Brother     Diabetes Brother     Heart disease Son     Diabetes Son     Hyperlipidemia Son     Hypertension Son        ROS: No acute cardiac events, no acute respiratory complaints.     Physical Exam (all patients):    BP (!) 143/78 (BP Location: Left arm, Patient Position: Lying)   Pulse 66   Temp 98.1 °F (36.7 °C) (Temporal)   Resp 18   Ht 4' 11" (1.499 m)   Wt 58.5 kg (129 lb)   SpO2 97%   Breastfeeding No   BMI 26.05 kg/m²   Lungs: Clear to auscultation bilaterally, respirations unlabored  Heart: Regular rate and rhythm, S1 and S2 normal, no obvious murmurs  Abdomen:         Soft, non-tender, bowel sounds normal, no masses, no organomegaly    Lab Results   Component Value Date    WBC 8.13 04/27/2022    MCV 81 (L) 04/27/2022    RDW 16.1 (H) 04/27/2022     04/27/2022    GLU 93 04/27/2022    HGBA1C 9.8 (H) 10/10/2006    BUN 13 04/27/2022     04/27/2022    K 4.1 04/27/2022     04/27/2022        SEDATION PLAN: per anesthesia      History reviewed, vital signs satisfactory, cardiopulmonary status satisfactory, sedation options, risks and plans have been discussed with the patient  All their questions were answered and the patient agrees to the sedation procedures as planned and the patient is deemed an appropriate candidate for the sedation as planned.    Procedure explained to patient, informed consent obtained and placed in chart.    Nish Osorio  4/27/2022  4:12 PM     "

## 2022-04-27 NOTE — HPI
Rolly Swain is a 71 y.o. female with a PMHx of DM II, GERD, h/o SBO/colonic stricture, HLD, and HTN who presented to the Emergency Department with c/o generalized abdominal pain, onset 1 day PTA. Pt had a sigmoid colon stent placed on 3/25/22, and was admitted at [facility] last week for partial SBO. Pt was referred to the ED today by Dr. Villasenor (Gastroenterology) for further evaluation. Symptoms are constant and moderate in severity. Pain is worse with movement or after eating. Associated nausea and constipation. Pt is still passing gas. Denies any fever, chills, ABD distention, diarrhea, vomiting, melena, hematochezia, dysuria, hematuria, back pain, SOB, CP, weakness, or change in appetite. Pt is currently on Phenergan and Protonix. Work-up in the ED showed unremarkable labs and UA. CT ABD/pelvis showed moderate constipation; anastomosis seen within the distal sigmoid colon as well as within the left lower quadrant within what appears to be small bowel loops, no definite obstructive pattern; no colonic stent seen. Patient was given brown bomb enema and digitally disimpacted in the ED. Case was discussed with GI per the ED, who will see patient in consult and possible flex sig with stent replacement tomorrow. Hospital Medicine was contacted for admission and patient placed in Observation.

## 2022-04-27 NOTE — SUBJECTIVE & OBJECTIVE
Interval History: Pending Flex sig and stent placement this AM    Review of Systems   Constitutional:  Negative for chills, diaphoresis, fatigue and fever.   Respiratory:  Negative for cough, shortness of breath and wheezing.    Cardiovascular:  Negative for chest pain, palpitations and leg swelling.   Gastrointestinal:  Positive for abdominal pain, constipation and nausea. Negative for abdominal distention, blood in stool, diarrhea and vomiting.   Genitourinary:  Negative for decreased urine volume, difficulty urinating, dysuria, flank pain, hematuria and pelvic pain.   Musculoskeletal:  Negative for arthralgias, back pain and myalgias.   Skin:  Negative for pallor and rash.   Neurological:  Negative for dizziness, syncope, weakness, light-headedness, numbness and headaches.   Psychiatric/Behavioral:  The patient is not nervous/anxious.    All other systems reviewed and are negative.  Objective:     Vital Signs (Most Recent):  Temp: 97.9 °F (36.6 °C) (04/27/22 1126)  Pulse: 64 (04/27/22 1126)  Resp: 18 (04/27/22 1126)  BP: (!) 104/55 (04/27/22 1126)  SpO2: 95 % (04/27/22 1126)   Vital Signs (24h Range):  Temp:  [97.6 °F (36.4 °C)-98.4 °F (36.9 °C)] 97.9 °F (36.6 °C)  Pulse:  [64-84] 64  Resp:  [16-21] 18  SpO2:  [95 %-100 %] 95 %  BP: (101-211)/(55-93) 104/55     Weight: 64.7 kg (142 lb 10.2 oz)  Body mass index is 28.81 kg/m².    Intake/Output Summary (Last 24 hours) at 4/27/2022 1356  Last data filed at 4/27/2022 0900  Gross per 24 hour   Intake 1549 ml   Output --   Net 1549 ml      Physical Exam  Vitals and nursing note reviewed.   Constitutional:       General: She is awake. She is not in acute distress.     Appearance: Normal appearance. She is well-developed. She is not diaphoretic.   HENT:      Head: Normocephalic and atraumatic.   Eyes:      Conjunctiva/sclera: Conjunctivae normal.      Comments: PERRL; EOM intact.   Cardiovascular:      Rate and Rhythm: Normal rate and regular rhythm. No extrasystoles are  present.     Heart sounds: S1 normal and S2 normal. No murmur heard.  Pulmonary:      Effort: Pulmonary effort is normal. No tachypnea.      Breath sounds: Normal breath sounds and air entry. No wheezing, rhonchi or rales.   Abdominal:      General: Bowel sounds are decreased. There is distension (mild).      Palpations: Abdomen is soft.      Tenderness: There is generalized abdominal tenderness. There is no guarding or rebound.   Musculoskeletal:         General: No tenderness or deformity. Normal range of motion.      Cervical back: Normal range of motion and neck supple.      Right lower leg: No edema.      Left lower leg: No edema.   Skin:     General: Skin is warm and dry.      Capillary Refill: Capillary refill takes less than 2 seconds.      Findings: No erythema or rash.   Neurological:      General: No focal deficit present.      Mental Status: She is alert and oriented to person, place, and time.   Psychiatric:         Mood and Affect: Mood and affect normal.         Behavior: Behavior normal. Behavior is cooperative.       Significant Labs: All pertinent labs within the past 24 hours have been reviewed.    Significant Imaging: I have reviewed all pertinent imaging results/findings within the past 24 hours.

## 2022-04-27 NOTE — TRANSFER OF CARE
"Anesthesia Transfer of Care Note    Patient: Rolly Swain    Procedure(s) Performed: Procedure(s) (LRB):  SIGMOIDOSCOPY, FLEXIBLE (N/A)    Patient location: GI    Anesthesia Type: MAC    Transport from OR: Transported from OR on room air with adequate spontaneous ventilation    Post pain: adequate analgesia    Post assessment: no apparent anesthetic complications    Post vital signs: stable    Level of consciousness: awake    Nausea/Vomiting: no nausea/vomiting    Complications: none    Transfer of care protocol was followed      Last vitals:   Visit Vitals  BP (!) 143/78 (BP Location: Left arm, Patient Position: Lying)   Pulse 66   Temp 36.7 °C (98.1 °F) (Temporal)   Resp 18   Ht 4' 11" (1.499 m)   Wt 58.5 kg (129 lb)   SpO2 97%   Breastfeeding No   BMI 26.05 kg/m²     "

## 2022-04-27 NOTE — CONSULTS
[facility]  Gastroenterology  Consult Note    Patient Name: Rolly Swain  MRN: [id number]  Admission Date: 4/26/2022  Hospital Length of Stay: 0 days  Code Status: Full Code   Attending Provider: Mervin Richardson MD   Consulting Provider: Nish Osorio MD  Primary Care Physician: Louise Ingram DO  Principal Problem:Colonic stricture    Inpatient consult to Gastroenterology  Consult performed by: Nish Osorio MD  Consult ordered by: Kandace Le NP  Reason for consult: Colon stricture        Subjective:     HPI:  This is a 71 y.o. female here for abdominal pain.  She had a colonoscopy on  3/7/2022 showed a a cm stricture in the sigmoid that was not teraversable. EGD on 3/7/2022 that showed gastritis and biopsies obtianed. Food seen in the gastric cavity as well.      Surgery in June 2021 it was a sigmoid colectomy with Dr. Pk Groves.      Prior diverticulitis s/p resection she had a colonoscopy in 2016/2017 in August 2021 that showed sigmoid diverticulitis persistent inflammation and no obstruction.     She underwent flex sig on 3/25/2022 for treatment of the stricture via stent placement. A 15 mm LAMS was placed across the stricture. She did well after the procedure and her symptoms resolved. Until 1 week ago her abdominal pain, nausea, and non-bloody emesis occurred. CT at [facility] was concerning for SBO but no SBO seen. Stent was not seen at the anastomosis and she had passed it with BM. Sent to ER after being seen in clinic and CT showed no SBO and stent again not seen.        Past Medical History:   Diagnosis Date    Abnormal Pap smear of vagina 2013    Contusion of left arm chronic pain    Depression     Diabetes mellitus type II     GERD (gastroesophageal reflux disease)     Hx SBO     Hyperlipidemia     Hypertension     Psoriatic arthritis        Past Surgical History:   Procedure Laterality Date    COLONOSCOPY  9/24/2013    ESOPHAGOGASTRODUODENOSCOPY  9/20/2010     FLEXIBLE SIGMOIDOSCOPY N/A 3/25/2022    Procedure: SIGMOIDOSCOPY, FLEXIBLE with stent placement and dilation;  Surgeon: Nish Osorio MD;  Location[facility];  Service: Endoscopy;  Laterality: N/A;  Room 1, pediatric colon scope, flouro, contrast, wire, olympus stone retreival balloon, contrast, Axios 20 mm, kenalog, CRE dilation balloon all sizes.    HYSTERECTOMY      SMALL INTESTINE SURGERY      2 small bowel resections 2/2 to SBO    SMALL INTESTINE SURGERY  06/2021    TONSILLECTOMY      TUBAL LIGATION         Review of patient's allergies indicates:   Allergen Reactions    Demerol [meperidine]     Duloxetine     Latex, natural rubber      Family History       Problem Relation (Age of Onset)    COPD Father    Diabetes Sister, Brother, Son    Heart disease Mother, Father, Sister, Brother, Son    Hyperlipidemia Son    Hypertension Mother, Son          Tobacco Use    Smoking status: Never Smoker    Smokeless tobacco: Never Used   Substance and Sexual Activity    Alcohol use: No    Drug use: No    Sexual activity: Not Currently     Birth control/protection: Surgical     Review of Systems   Constitutional:  Negative for diaphoresis, fatigue and fever.   Respiratory:  Negative for chest tightness and shortness of breath.    Cardiovascular:  Negative for chest pain.   Gastrointestinal:  Positive for abdominal distention, abdominal pain, constipation, nausea and vomiting. Negative for anal bleeding, blood in stool, diarrhea and rectal pain.   Musculoskeletal:  Negative for arthralgias and back pain.   Objective:     Vital Signs (Most Recent):  Temp: 98.1 °F (36.7 °C) (04/27/22 1457)  Pulse: 70 (04/27/22 1655)  Resp: 19 (04/27/22 1655)  BP: (!) 158/69 (04/27/22 1655)  SpO2: 100 % (04/27/22 1655)   Vital Signs (24h Range):  Temp:  [97.6 °F (36.4 °C)-98.4 °F (36.9 °C)] 98.1 °F (36.7 °C)  Pulse:  [64-84] 70  Resp:  [16-20] 19  SpO2:  [95 %-100 %] 100 %  BP: (101-211)/(55-93) 158/69     Weight: 58.5  kg (129 lb) (04/27/22 1457)  Body mass index is 26.05 kg/m².      Intake/Output Summary (Last 24 hours) at 4/27/2022 1800  Last data filed at 4/27/2022 1635  Gross per 24 hour   Intake 250 ml   Output --   Net 250 ml       Lines/Drains/Airways       Peripheral Intravenous Line  Duration                  Peripheral IV - Single Lumen 04/26/22 1304 20 G Left Forearm 1 day                    Physical Exam  Constitutional:       Appearance: Normal appearance.   HENT:      Head: Normocephalic and atraumatic.   Eyes:      Extraocular Movements: Extraocular movements intact.      Pupils: Pupils are equal, round, and reactive to light.   Cardiovascular:      Rate and Rhythm: Normal rate and regular rhythm.      Pulses: Normal pulses.      Heart sounds: Normal heart sounds.   Pulmonary:      Effort: Pulmonary effort is normal.      Breath sounds: Normal breath sounds.   Abdominal:      General: Abdomen is flat. Bowel sounds are normal.      Palpations: Abdomen is soft.      Tenderness: There is abdominal tenderness.   Neurological:      General: No focal deficit present.      Mental Status: She is alert and oriented to person, place, and time.       Significant Labs:  Acute Hepatitis Panel: No results for input(s): HEPBSAG, HEPAIGM, HEPCAB in the last 48 hours.    Invalid input(s): HAPBIGM  Amylase: No results for input(s): AMYLASE in the last 48 hours.  Blood Culture: No results for input(s): LABBLOO in the last 48 hours.  CBC:   Recent Labs   Lab 04/26/22  1254 04/27/22  0601   WBC 9.98 8.13   HGB 10.8* 9.9*   HCT 34.9* 33.8*    417     BMP:   Recent Labs   Lab 04/27/22  0601   GLU 93      K 4.1      CO2 27   BUN 13   CREATININE 0.9   CALCIUM 8.9   MG 1.9     CMP:   Recent Labs   Lab 04/27/22  0601   GLU 93   CALCIUM 8.9   ALBUMIN 3.2*   PROT 6.2      K 4.1   CO2 27      BUN 13   CREATININE 0.9   ALKPHOS 72   ALT 17   AST 17   BILITOT 0.5     Coagulation: No results for input(s): PT, INR, APTT  in the last 48 hours.  CRP: No results for input(s): CRP in the last 48 hours.  ESR: No results for input(s): SEDRATE in the last 48 hours.    Significant Imaging:  Imaging results within the past 24 hours have been reviewed.    Assessment/Plan:     * Colonic stricture  Plan for flex sig with assessment and possible stent placement if still strictured. Otherwise her symptoms are most likely secondary to adhesions.        Thank you for your consult. I will follow-up with patient. Please contact us if you have any additional questions.    Nish Osorio MD  Gastroenterology  [facility]   Statement Selected

## 2022-04-28 VITALS
DIASTOLIC BLOOD PRESSURE: 72 MMHG | TEMPERATURE: 98 F | HEART RATE: 79 BPM | SYSTOLIC BLOOD PRESSURE: 142 MMHG | WEIGHT: 147.69 LBS | HEIGHT: 59 IN | RESPIRATION RATE: 16 BRPM | OXYGEN SATURATION: 98 % | BODY MASS INDEX: 29.77 KG/M2

## 2022-04-28 LAB
POCT GLUCOSE: 80 MG/DL (ref 70–110)
POCT GLUCOSE: 96 MG/DL (ref 70–110)

## 2022-04-28 PROCEDURE — 25000003 PHARM REV CODE 250: Performed by: STUDENT IN AN ORGANIZED HEALTH CARE EDUCATION/TRAINING PROGRAM

## 2022-04-28 RX ADMIN — HYDROCHLOROTHIAZIDE 25 MG: 25 TABLET ORAL at 08:04

## 2022-04-28 RX ADMIN — HYDROCODONE BITARTRATE AND ACETAMINOPHEN 1 TABLET: 10; 325 TABLET ORAL at 07:04

## 2022-04-28 RX ADMIN — VALSARTAN 320 MG: 160 TABLET, FILM COATED ORAL at 08:04

## 2022-04-28 NOTE — PLAN OF CARE
04/28/22 1014   Post-Acute Status   Post-Acute Authorization Home Health   Home Health Status Referrals Sent   Patient choice form signed by patient/caregiver List from System Post-Acute Care

## 2022-04-28 NOTE — DISCHARGE SUMMARY
[facility]  Hospital Medicine  Discharge Summary      Patient Name: Rolly Swain  MRN: [id number]  Patient Class: OP- Outpatient Recovery  Admission Date: 4/26/2022  Hospital Length of Stay: 0 days  Discharge Date and Time: 4/28/2022 12:23 PM  Attending Physician: Dr. Mervin Richardson   Discharging Provider: Charla Mckeon NP  Primary Care Provider: Louise Ingram DO      HPI:   Rolly Swain is a 71 y.o. female with a PMHx of DM II, GERD, h/o SBO/colonic stricture, HLD, and HTN who presented to the Emergency Department with c/o generalized abdominal pain, onset 1 day PTA. Pt had a sigmoid colon stent placed on 3/25/22, and was admitted at [facility] last week for partial SBO. Pt was referred to the ED today by Dr. Villasenor (Gastroenterology) for further evaluation. Symptoms are constant and moderate in severity. Pain is worse with movement or after eating. Associated nausea and constipation. Pt is still passing gas. Denies any fever, chills, ABD distention, diarrhea, vomiting, melena, hematochezia, dysuria, hematuria, back pain, SOB, CP, weakness, or change in appetite. Pt is currently on Phenergan and Protonix. Work-up in the ED showed unremarkable labs and UA. CT ABD/pelvis showed moderate constipation; anastomosis seen within the distal sigmoid colon as well as within the left lower quadrant within what appears to be small bowel loops, no definite obstructive pattern; no colonic stent seen. Patient was given brown bomb enema and digitally disimpacted in the ED. Case was discussed with GI per the ED, who will see patient in consult and possible flex sig with stent replacement tomorrow. Hospital Medicine was contacted for admission and patient placed in Observation.       Procedure(s) (LRB):  SIGMOIDOSCOPY, FLEXIBLE (N/A)      Hospital Course:   Per chart reiview, patient was seen by GI Dr Villasenor just yesterday post op sigmoid colons stent of anastomotic stricture on 3/25/22. Initially she did fine but began having the  name symptoms. Plan was for a series of 3 stents. On 4/27/22,  CT abdomen negative for obstruction but showed mild hydronephrosis-non obstructing, moderate constipation. She was digitally disimpacted and given enema in the ED. GI consulted for flex sigmoid scope with stent placement pending per Dr Osorio this morning. On 4/28/22, flexible sigmoidoscopy performed with no stricture identified and symptoms likely attributed to adhesions.  Pt tolerated diet post procedure with vital signs stable and no signs of acute distress.  Case management consulted with home health established. Pt seen and examined and deemed stable for discharge to home. Medications reconciled. Pt instructed to follow up with PCP and established General Surgeon upon discharge for further evaluation.        Goals of Care Treatment Preferences:  Code Status: Full Code      Consults:   Consults (From admission, onward)        Status Ordering Provider     Inpatient consult to Social Work/Case Management  Once        Provider:  (Not yet assigned)    MARNIE Lieberman     Inpatient consult to Gastroenterology  Once        Provider:  Nish Osorio MD    Completed GINA CONNELL          Final Active Diagnoses:    Diagnosis Date Noted POA    PRINCIPAL PROBLEM:  Colonic stricture [K56.699] 04/26/2022 Yes    Constipation [K59.00] 04/27/2022 Yes    Primary hypertension [I10] 04/26/2022 Yes     Chronic    Type 2 diabetes mellitus, without long-term current use of insulin [E11.9] 10/02/2012 Yes     Chronic      Problems Resolved During this Admission:       Discharged Condition: stable    Disposition: Home-Health Care [facility]    Follow Up:   Follow-up Information     Louise Ingram DO. Go on 5/9/2022.    Specialty: Internal Medicine  Why: at 11:00 for hospital follow up  Contact information:  [address]  [place] [id number] [phone]             [facility] Follow up.    Specialty: Home Health Services  Why:  Home Health  Contact information:  [address]  [place] [facility] [address]  [phone]             Pk Groves MD Follow up in 1 week(s).    Specialty: General Surgery  Why: -hospital follow up  Contact information:  [address] [id number] [phone]                       Patient Instructions:      Ambulatory referral/consult to Home Health   Referral Priority: Routine Referral Type: Home Health   Referral Reason: Specialty Services Required   Requested Specialty: Home Health Services   Number of Visits Requested: 1     Diet diabetic     Diet Cardiac     Notify your health care provider if you experience any of the following:  temperature >100.4     Notify your health care provider if you experience any of the following:  persistent nausea and vomiting or diarrhea     Notify your health care provider if you experience any of the following:  increased confusion or weakness     Notify your health care provider if you experience any of the following:  persistent dizziness, light-headedness, or visual disturbances     Notify your health care provider if you experience any of the following:  severe persistent headache     Notify your health care provider if you experience any of the following:  difficulty breathing or increased cough     Notify your health care provider if you experience any of the following:  severe uncontrolled pain     Activity as tolerated       Significant Diagnostic Studies: Labs: All labs within the past 24 hours have been reviewed    Pending Diagnostic Studies:     None         Medications:  Reconciled Home Medications:      Medication List      CONTINUE taking these medications    albuterol 90 mcg/actuation inhaler  Commonly known as: PROVENTIL/VENTOLIN HFA  Inhale 2 puffs into the lungs every 6 (six) hours as needed. Rescue     cholecalciferol (vitamin D3) 1,250 mcg (50,000 unit) Tab  Take 1 tablet by mouth.     fluticasone propionate 50  mcg/actuation nasal spray  Commonly known as: FLONASE  1 spray by Each Nostril route 2 (two) times daily.     glipiZIDE 5 MG tablet  Commonly known as: GLUCOTROL  Take 5 mg by mouth once daily.     HYDROcodone-acetaminophen  mg per tablet  Commonly known as: NORCO  Take 1 tablet by mouth every 6 (six) hours as needed.     metFORMIN 500 MG tablet  Commonly known as: GLUCOPHAGE  Take 500 mg by mouth 2 (two) times daily with meals.     * nystatin cream  Commonly known as: MYCOSTATIN  Apply topically 2 (two) times daily.     * nystatin powder  Commonly known as: MYCOSTATIN  Apply topically once daily. as directed     pantoprazole 40 MG tablet  Commonly known as: PROTONIX  Take 40 mg by mouth once daily.     pregabalin 100 MG capsule  Commonly known as: LYRICA  Take 100 mg by mouth 2 (two) times daily.     SKYRIZI 150mg/1.66mL(75 mg/0.83 mL x2) subcutaneous injection  Generic drug: risankizumab-rzaa  Inject 150 mg into the skin every 30 days.     TRULICITY 1.5 mg/0.5 mL pen injector  Generic drug: dulaglutide  Inject 1.5 mg into the skin every 7 days.     valsartan-hydrochlorothiazide 320-25 mg per tablet  Commonly known as: DIOVAN-HCT  Take 1 tablet by mouth once daily.         * This list has 2 medication(s) that are the same as other medications prescribed for you. Read the directions carefully, and ask your doctor or other care provider to review them with you.            STOP taking these medications    omeprazole 40 MG capsule  Commonly known as: PRILOSEC     TALTZ SYRINGE SUBQ            Indwelling Lines/Drains at time of discharge:   Lines/Drains/Airways     None                 Time spent on the discharge of patient: > 32 minutes         Charla Mckeon NP  Department of Hospital Medicine  [facility]

## 2022-04-28 NOTE — CHAPLAIN
Initial visit with patient.  Visited with patient to assess for spiritual and emotional needs.  Pt has been going through a lot and is really struggling due to lack of support from family.  Pt spent time talking about this with me and is trying to work through these difficulties.  Pt wanted me to pray for her and I took time to do this before leaving.  Spiritual care remains available as needed.    Chaplain Gabriel Harrison M.Div., BCC

## 2022-04-28 NOTE — CONSULTS
CM met with patient at the bedside to discuss home health services.  Patient provided with a post acute listing of home health companies.  Preference is for [facility].  Choice form completed and placed in the patient blue folder.  Referral made in [facility].

## 2022-04-28 NOTE — PLAN OF CARE
Accepted by [facility]       04/28/22 1042   Post-Acute Status   Post-Acute Authorization [facility]   Home Health Status Set-up Complete/Auth obtained

## 2022-04-29 NOTE — PLAN OF CARE
[facility]  Discharge Final Note    Primary Care Provider: Louise Ingram DO    Expected Discharge Date: 4/28/2022    Final Discharge Note (most recent)     Final Note - 04/29/22 0758        Final Note    Assessment Type Final Discharge Note     Anticipated Discharge Disposition Home-Health Care [facility]     Hospital Resources/Appts/Education Provided Appointments scheduled and added to AVS        Post-Acute Status    Post-Acute Authorization Home Health     Home Health Status Set-up Complete/Auth obtained                 Important Message from Medicare             Contact Info     Louise Ingram DO   Specialty: Internal Medicine   Relationship: PCP - General    [facility]  [address] [address]   Phone: [phone]       Next Steps: Go on 5/9/2022    Instructions: at 11:00 for hospital follow up    [facility]   Specialty: Home Health Services    [address], [address]  [place] [facility] [address]   Phone: [phone]       Next Steps: Follow up    Instructions: Home Health    Pk Groves MD   Specialty: General Surgery    [facility]  [address] [address]   Phone: [phone]       Next Steps: Follow up in 1 week(s)    Instructions: -hospital follow up

## 2024-04-04 NOTE — H&P
"[facility] [address] Medicine  History & Physical    Patient Name: Rolly Swain  MRN: [id number]  Patient Class: OP- Observation  Admission Date: 4/26/2022  Attending Physician: Samir Ann MD   Primary Care Provider: Louise Ingram DO         Patient information was obtained from patient, past medical records and ER records.     Subjective:     Principal Problem:Colonic stricture    Chief Complaint:   Chief Complaint   Patient presents with    Abdominal Pain     Placement of a sigmoid colons stent on 3/25, seen about a week ago at [facility] for problems/possible SBO, now c/o difficulty eating without nausea, denies vomiting or diarrhea, states BM "aren't coming out right", LBM 2 days ago        HPI: Rolly Swain is a 71 y.o. female with a PMHx of DM II, GERD, h/o SBO/colonic stricture, HLD, and HTN who presented to the Emergency Department with c/o generalized abdominal pain, onset 1 day PTA. Pt had a sigmoid colon stent placed on 3/25/22, and was admitted at [facility] last week for partial SBO. Pt was referred to the ED today by Dr. Villasenor (Gastroenterology) for further evaluation. Symptoms are constant and moderate in severity. Pain is worse with movement or after eating. Associated nausea and constipation. Pt is still passing gas. Denies any fever, chills, ABD distention, diarrhea, vomiting, melena, hematochezia, dysuria, hematuria, back pain, SOB, CP, weakness, or change in appetite. Pt is currently on Phenergan and Protonix. Work-up in the ED showed unremarkable labs and UA. CT ABD/pelvis showed moderate constipation; anastomosis seen within the distal sigmoid colon as well as within the left lower quadrant within what appears to be small bowel loops, no definite obstructive pattern; no colonic stent seen. Patient was given brown bomb enema and digitally disimpacted in the ED. Case was discussed with GI per the ED, who will see patient in consult and possible flex sig with stent replacement tomorrow. " Mcm sent to remind pt of pre-clinic labs.   Hospital Medicine was contacted for admission and patient placed in Observation.       Past Medical History:   Diagnosis Date    Abnormal Pap smear of vagina 2013    Contusion of left arm chronic pain    Depression     Diabetes mellitus type II     GERD (gastroesophageal reflux disease)     Hx SBO     Hyperlipidemia     Hypertension     Psoriatic arthritis        Past Surgical History:   Procedure Laterality Date    COLONOSCOPY  9/24/2013    ESOPHAGOGASTRODUODENOSCOPY  9/20/2010    FLEXIBLE SIGMOIDOSCOPY N/A 3/25/2022    Procedure: SIGMOIDOSCOPY, FLEXIBLE with stent placement and dilation;  Surgeon: Nish Osorio MD;  Location: [facility];  Service: Endoscopy;  Laterality: N/A;  Room 1, pediatric colon scope, flouro, contrast, wire, olympus stone retreival balloon, contrast, Axios 20 mm, kenalog, CRE dilation balloon all sizes.    HYSTERECTOMY      SMALL INTESTINE SURGERY      2 small bowel resections 2/2 to SBO    SMALL INTESTINE SURGERY  06/2021    TONSILLECTOMY      TUBAL LIGATION         Review of patient's allergies indicates:   Allergen Reactions    Demerol [meperidine]     Duloxetine     Latex, natural rubber        No current facility-administered medications on file prior to encounter.     Current Outpatient Medications on File Prior to Encounter   Medication Sig    albuterol (PROVENTIL/VENTOLIN HFA) 90 mcg/actuation inhaler Inhale 2 puffs into the lungs every 6 (six) hours as needed. Rescue    cholecalciferol, vitamin D3, 1,250 mcg (50,000 unit) Tab Take 1 tablet by mouth.    dulaglutide (TRULICITY) 1.5 mg/0.5 mL pen injector Inject 1.5 mg into the skin every 7 days.    fluticasone (FLONASE) 50 mcg/actuation nasal spray 1 spray by Each Nostril route 2 (two) times daily.    glipiZIDE (GLUCOTROL) 5 MG tablet Take 5 mg by mouth once daily.    hydrocodone-acetaminophen 10-325mg (NORCO)  mg Tab Take 1 tablet by mouth every 6 (six) hours as needed.    IXEKIZUMAB (TALTZ  SYRINGE SUBQ) Inject into the skin. Every 4 weeks    metformin (GLUCOPHAGE) 500 MG tablet Take 500 mg by mouth 2 (two) times daily with meals.    nystatin (MYCOSTATIN) cream Apply topically 2 (two) times daily.    nystatin (MYCOSTATIN) powder Apply topically once daily. as directed    omeprazole (PRILOSEC) 40 MG capsule Take 40 mg by mouth once daily.    pantoprazole (PROTONIX) 40 MG tablet Take 40 mg by mouth once daily.    pregabalin (LYRICA) 100 MG capsule Take 100 mg by mouth 2 (two) times daily.    risankizumab-rzaa (SKYRIZI) 150mg/1.66mL(75 mg/0.83 mL x2) subcutaneous injection Inject 150 mg into the skin every 30 days.    valsartan-hydrochlorothiazide (DIOVAN-HCT) 320-25 mg per tablet Take 1 tablet by mouth once daily.    [DISCONTINUED] acyclovir 5% (ZOVIRAX) 5 % ointment Apply four times daily until resolved    [DISCONTINUED] atorvastatin (LIPITOR) 20 MG tablet Take 20 mg by mouth.    [DISCONTINUED] busPIRone (BUSPAR) 5 MG Tab Take 5 mg by mouth.    [DISCONTINUED] clobetasol (TEMOVATE) 0.05 % cream Apply topically.    [DISCONTINUED] estradioL (VAGIFEM) 10 mcg Tab Place 1 tablet (10 mcg total) vaginally every evening for 14 days, THEN 1 tablet (10 mcg total) twice a week for 14 days.    [DISCONTINUED] insulin syringe-needle U-100 0.3 mL 29 gauge Syrg     [DISCONTINUED] insulin syringe-needle U-100 1 mL 31 gauge x 5/16 Syrg USE ONCE DAILY    [DISCONTINUED] insulin syringe-needle U-100 1/2 mL 30 Syrg USE ONCE DAILY AS DIRECTED    [DISCONTINUED] ixekizumab 80 mg/mL AtIn Inject into the skin.    [DISCONTINUED] ixekizumab 80 mg/mL Syrg Inject 80 mg ( 1 injection) SQ every 4 weeks    [DISCONTINUED] linaCLOtide (LINZESS) 145 mcg Cap capsule Take 145 mcg by mouth.    [DISCONTINUED] methotrexate 2.5 MG Tab Take 6 tablets by mouth once a week.    [DISCONTINUED] mometasone (NASONEX) 50 mcg/actuation nasal spray 2 sprays by Nasal route 2 (two) times daily.    [DISCONTINUED] mupirocin calcium 2%  (BACTROBAN) 2 % cream Apply to affected area bid    [DISCONTINUED] pramoxine-hydrocortisone cream Apply qid for itching.  Dispense the 2.5% cream    [DISCONTINUED] PROAIR HFA 90 mcg/actuation inhaler Take 2 puffs by mouth as needed.    [DISCONTINUED] promethazine (PHENERGAN) 25 MG tablet     [DISCONTINUED] sertraline (ZOLOFT) 50 MG tablet     [DISCONTINUED] TALTZ AUTOINJECTOR 80 mg/mL AtIn every 28 days.    [DISCONTINUED] TOUJEO SOLOSTAR U-300 INSULIN 300 unit/mL (1.5 mL) InPn pen     [DISCONTINUED] traZODone (DESYREL) 50 MG tablet     [DISCONTINUED] TREMFYA 100 mg/mL Syrg     [DISCONTINUED] triamcinolone acetonide 0.1% (KENALOG) 0.1 % ointment 2 (two) times daily.    [DISCONTINUED] valACYclovir (VALTREX) 1000 MG tablet 1 po qd     Family History       Problem Relation (Age of Onset)    COPD Father    Diabetes Sister, Brother, Son    Heart disease Mother, Father, Sister, Brother, Son    Hyperlipidemia Son    Hypertension Mother, Son          Tobacco Use    Smoking status: Never Smoker    Smokeless tobacco: Never Used   Substance and Sexual Activity    Alcohol use: No    Drug use: No    Sexual activity: Not Currently     Birth control/protection: Surgical     Review of Systems   Constitutional:  Negative for chills, diaphoresis, fatigue and fever.   Respiratory:  Negative for cough, shortness of breath and wheezing.    Cardiovascular:  Negative for chest pain, palpitations and leg swelling.   Gastrointestinal:  Positive for abdominal pain, constipation and nausea. Negative for abdominal distention, blood in stool, diarrhea and vomiting.   Genitourinary:  Negative for decreased urine volume, difficulty urinating, dysuria, flank pain, hematuria and pelvic pain.   Musculoskeletal:  Negative for arthralgias, back pain and myalgias.   Skin:  Negative for pallor and rash.   Neurological:  Negative for dizziness, syncope, weakness, light-headedness, numbness and headaches.   Psychiatric/Behavioral:  The  patient is not nervous/anxious.    All other systems reviewed and are negative.  Objective:     Vital Signs (Most Recent):  Temp: 98.4 °F (36.9 °C) (04/26/22 2130)  Pulse: 78 (04/26/22 2130)  Resp: 18 (04/26/22 2130)  BP: (!) 101/59 (04/26/22 2130)  SpO2: 98 % (04/26/22 2130)   Vital Signs (24h Range):  Temp:  [97.7 °F (36.5 °C)-98.4 °F (36.9 °C)] 98.4 °F (36.9 °C)  Pulse:  [65-84] 78  Resp:  [16-21] 18  SpO2:  [98 %-100 %] 98 %  BP: (101-211)/(59-94) 101/59        There is no height or weight on file to calculate BMI.    Physical Exam  Vitals and nursing note reviewed.   Constitutional:       General: She is awake. She is not in acute distress.     Appearance: Normal appearance. She is well-developed. She is not diaphoretic.   HENT:      Head: Normocephalic and atraumatic.   Eyes:      Conjunctiva/sclera: Conjunctivae normal.      Comments: PERRL; EOM intact.   Cardiovascular:      Rate and Rhythm: Normal rate and regular rhythm. No extrasystoles are present.     Heart sounds: S1 normal and S2 normal. No murmur heard.  Pulmonary:      Effort: Pulmonary effort is normal. No tachypnea.      Breath sounds: Normal breath sounds and air entry. No wheezing, rhonchi or rales.   Abdominal:      General: Bowel sounds are decreased. There is distension (mild).      Palpations: Abdomen is soft.      Tenderness: There is generalized abdominal tenderness. There is no guarding or rebound.   Musculoskeletal:         General: No tenderness or deformity. Normal range of motion.      Cervical back: Normal range of motion and neck supple.      Right lower leg: No edema.      Left lower leg: No edema.   Skin:     General: Skin is warm and dry.      Capillary Refill: Capillary refill takes less than 2 seconds.      Findings: No erythema or rash.   Neurological:      General: No focal deficit present.      Mental Status: She is alert and oriented to person, place, and time.   Psychiatric:         Mood and Affect: Mood and affect normal.          Behavior: Behavior normal. Behavior is cooperative.           Significant Labs:  Results for orders placed or performed during the hospital encounter of 04/26/22   CBC W/ AUTO DIFFERENTIAL   Result Value Ref Range    WBC 9.98 3.90 - 12.70 K/uL    RBC 4.56 4.00 - 5.40 M/uL    Hemoglobin 10.8 (L) 12.0 - 16.0 g/dL    Hematocrit 34.9 (L) 37.0 - 48.5 %    MCV 77 (L) 82 - 98 fL    MCH 23.7 (L) 27.0 - 31.0 pg    MCHC 30.9 (L) 32.0 - 36.0 g/dL    RDW 15.9 (H) 11.5 - 14.5 %    Platelets 433 150 - 450 K/uL    MPV 9.6 9.2 - 12.9 fL    Immature Granulocytes 0.2 0.0 - 0.5 %    Gran # (ANC) 6.3 1.8 - 7.7 K/uL    Immature Grans (Abs) 0.02 0.00 - 0.04 K/uL    Lymph # 2.6 1.0 - 4.8 K/uL    Mono # 0.8 0.3 - 1.0 K/uL    Eos # 0.2 0.0 - 0.5 K/uL    Baso # 0.06 0.00 - 0.20 K/uL    nRBC 0 0 /100 WBC    Gran % 63.3 38.0 - 73.0 %    Lymph % 25.7 18.0 - 48.0 %    Mono % 8.4 4.0 - 15.0 %    Eosinophil % 1.8 0.0 - 8.0 %    Basophil % 0.6 0.0 - 1.9 %    Differential Method Automated    Comp. Metabolic Panel   Result Value Ref Range    Sodium 139 136 - 145 mmol/L    Potassium 3.8 3.5 - 5.1 mmol/L    Chloride 100 95 - 110 mmol/L    CO2 27 23 - 29 mmol/L    Glucose 115 (H) 70 - 110 mg/dL    BUN 16 8 - 23 mg/dL    Creatinine 0.9 0.5 - 1.4 mg/dL    Calcium 9.8 8.7 - 10.5 mg/dL    Total Protein 7.8 6.0 - 8.4 g/dL    Albumin 4.0 3.5 - 5.2 g/dL    Total Bilirubin 0.5 0.1 - 1.0 mg/dL    Alkaline Phosphatase 85 55 - 135 U/L    AST 20 10 - 40 U/L    ALT 19 10 - 44 U/L    Anion Gap 12 8 - 16 mmol/L    eGFR if African American >60 >60 mL/min/1.73 m^2    eGFR if non African American >60 >60 mL/min/1.73 m^2   Lipase   Result Value Ref Range    Lipase 18 4 - 60 U/L   Urinalysis, Reflex to Urine Culture Urine, Clean Catch    Specimen: Urine   Result Value Ref Range    Specimen UA Urine, Clean Catch     Color, UA Yellow Yellow, Straw, Mildred    Appearance, UA Clear Clear    pH, UA 6.0 5.0 - 8.0    Specific Gravity, UA <=1.005 (A) 1.005 - 1.030     Protein, UA Negative Negative    Glucose, UA Negative Negative    Ketones, UA Negative Negative    Bilirubin (UA) Negative Negative    Occult Blood UA Negative Negative    Nitrite, UA Negative Negative    Urobilinogen, UA Negative <2.0 EU/dL    Leukocytes, UA 1+ (A) Negative   COVID-19 Rapid Screening   Result Value Ref Range    SARS-CoV-2 RNA, Amplification, Qual Negative Negative   Urinalysis Microscopic   Result Value Ref Range    WBC, UA 3 0 - 5 /hpf    Squam Epithel, UA 4 /hpf    Microscopic Comment SEE COMMENT    POCT glucose   Result Value Ref Range    POCT Glucose 87 70 - 110 mg/dL   POCT glucose   Result Value Ref Range    POCT Glucose 109 70 - 110 mg/dL   POCT glucose   Result Value Ref Range    POCT Glucose 128 (H) 70 - 110 mg/dL      All pertinent labs within the past 24 hours have been reviewed.    Significant Imaging:  Imaging Results              CT Abdomen Pelvis  Without Contrast (Final result)  Result time 04/26/22 15:13:53      Final result by Bossman Macdonald MD (04/26/22 15:13:53)                   Impression:      Evaluation of solid organ and vascular pathology is limited due to lack of IV contrast.  Consider follow-up CT with oral and IV contrast as clinically warranted.    Mild bilateral hydronephrosis and hydroureter without evidence of obstructing stone may reflect reflux disease.  No mass identified.  6 mm nonobstructing stone lower pole right kidney.    Moderate constipation. Anastomosis is seen within the distal sigmoid colon as well as within the left lower quadrant within what appears to be small bowel loops.  No definite obstructive pattern.    All CT scans at this facility use dose modulation, iterative reconstruction, and/or weight based dosing when appropriate to reduce radiation dose to as low as reasonable achievable.      Electronically signed by: Bossman Macdonald MD  Date:    04/26/2022  Time:    15:13               Narrative:    EXAMINATION:  CT ABDOMEN PELVIS WITHOUT  CONTRAST    CLINICAL HISTORY:  Nausea/vomiting;    TECHNIQUE:  Low dose axial images, sagittal and coronal reformations were obtained from the lung bases to the pubic symphysis.  Oral contrast was not administered.    COMPARISON:  04/26/2022    FINDINGS:  Heart: Normal size. No effusion.    Lung Bases: Clear.  Calcified granuloma left lower lobe benign.    Liver: Normal size and attenuation. No focal lesions.    Gallbladder: No calcified gallstones.    Bile Ducts: No dilatation.    Pancreas: No obvious mass. No peripancreatic fat stranding.    Spleen: Normal.  Benign granuloma within the spleen.    Adrenals: Normal.    Kidneys/Ureters: Mild renal cortical thinning.  Mild bilateral hydronephrosis and hydroureter without evidence of obstructing stone may reflect reflux disease.  No mass identified.  6 mm nonobstructing stone lower pole right kidney.    Bladder: No wall thickening.  Mild bladder distension.    Reproductive organs: Surgically absent    GI Tract/Mesentery: No evidence of bowel obstruction or inflammation.  Moderate constipation.  Anastomosis is seen within the distal sigmoid colon as well as within the left lower quadrant within what appears to be small bowel loops.  Appendix is not well seen.  Shotty mesenteric nodes which may be reactive.    Peritoneal Space: No ascites or free air.    Retroperitoneum: Shotty adenopathy.    Abdominal wall: Postoperative changes are seen within the midline of the abdominal wall.    Vasculature: No aneurysm. Aorta demonstrates atherosclerotic disease.    Bones: No acute fracture.  Mild degenerative changes.  High-density material seen within the disc space at T12/L1.  Heterogeneous marrow signal seen within the L3 and L4 vertebral body of unknown clinical significance.  Consider MRI for further evaluation.  No suspicious lytic or sclerotic lesions.                                       X-Ray Abdomen Flat And Erect (Final result)  Result time 04/26/22 13:41:46      Final  result by Yair Lees MD (04/26/22 13:41:46)                   Impression:      1.  Negative for acute process.    2.  Increased stool burden.    3.  Incidental findings as noted above.  This includes a possible 3 mm inferior pole nonobstructing right renal stone.      Electronically signed by: Yair Lees MD  Date:    04/26/2022  Time:    13:41               Narrative:    EXAMINATION:  XR ABDOMEN FLAT AND ERECT    CLINICAL HISTORY:  nausea vomiting;    TECHNIQUE:  Flat and erect AP views of the abdomen were performed.    COMPARISON:  None    FINDINGS:  Mildly increased stool burden.  Bowel-gas pattern is otherwise normal.  Negative for free air.  Mild degenerative changes of the spine and pelvis.  Lung bases are clear.  Bilateral buttock injection granulomas.  Postoperative changes overlie the pelvis.  There are phleboliths versus ureteroliths some overlying the pelvis.  Possible 3 mm inferior pole right renal stone.                                     I have reviewed all pertinent imaging results/findings within the past 24 hours.            Assessment/Plan:     * Colonic stricture  - Stent to sigmoid colon placed on 3/25 is no longer seen on imaging in the ED.   - GI consult. Plans for possible flex sig with stent replacement tomorrow. Will keep NPO after MN.    - IV hydration.      Constipation  - Patient digitally disimpacted and given brown bomb enema in the ED.     Primary hypertension  - Continue home antihypertensives.     Type 2 diabetes mellitus, without long-term current use of insulin  - Hold metformin and glipizide during hospital stay.   - Accuchecks with low dose SSI.  - Diabetic diet.      VTE Risk Mitigation (From admission, onward)         Ordered     Reason for No Pharmacological VTE Prophylaxis  Once        Question:  Reasons:  Answer:  Risk of Bleeding    04/26/22 2214     IP VTE HIGH RISK PATIENT  Once         04/26/22 2214     Place sequential compression device  Until discontinued          04/26/22 2214                   Kandace Le NP  Department of Hospital Medicine   [facility] - Med Surg [facility]